# Patient Record
Sex: MALE | Race: WHITE | Employment: OTHER | URBAN - METROPOLITAN AREA
[De-identification: names, ages, dates, MRNs, and addresses within clinical notes are randomized per-mention and may not be internally consistent; named-entity substitution may affect disease eponyms.]

---

## 2024-03-12 ENCOUNTER — HOSPITAL ENCOUNTER (EMERGENCY)
Age: 74
Discharge: HOME | End: 2024-03-12
Attending: EMERGENCY MEDICINE
Payer: OTHER GOVERNMENT

## 2024-03-12 ENCOUNTER — APPOINTMENT (OUTPATIENT)
Dept: CT IMAGING | Age: 74
End: 2024-03-12
Payer: OTHER GOVERNMENT

## 2024-03-12 VITALS
TEMPERATURE: 97.8 F | SYSTOLIC BLOOD PRESSURE: 125 MMHG | OXYGEN SATURATION: 100 % | HEART RATE: 99 BPM | WEIGHT: 202.16 LBS | RESPIRATION RATE: 16 BRPM | HEIGHT: 73 IN | BODY MASS INDEX: 26.79 KG/M2 | DIASTOLIC BLOOD PRESSURE: 63 MMHG
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
## 2024-03-12 DIAGNOSIS — M54.2 NECK PAIN: ICD-10-CM

## 2024-03-12 DIAGNOSIS — M79.604 RIGHT LEG PAIN: ICD-10-CM

## 2024-03-12 DIAGNOSIS — M54.50 ACUTE BILATERAL LOW BACK PAIN, UNSPECIFIED WHETHER SCIATICA PRESENT: ICD-10-CM

## 2024-03-12 PROCEDURE — 6370000000 HC RX 637 (ALT 250 FOR IP): Performed by: EMERGENCY MEDICINE

## 2024-03-12 PROCEDURE — 99284 EMERGENCY DEPT VISIT MOD MDM: CPT | Performed by: EMERGENCY MEDICINE

## 2024-03-12 PROCEDURE — 72125 CT NECK SPINE W/O DYE: CPT

## 2024-03-12 PROCEDURE — 70450 CT HEAD/BRAIN W/O DYE: CPT

## 2024-03-12 PROCEDURE — 96372 THER/PROPH/DIAG INJ SC/IM: CPT | Performed by: EMERGENCY MEDICINE

## 2024-03-12 PROCEDURE — 72131 CT LUMBAR SPINE W/O DYE: CPT

## 2024-03-12 PROCEDURE — 6360000002 HC RX W HCPCS: Performed by: EMERGENCY MEDICINE

## 2024-03-12 RX ORDER — ORPHENADRINE CITRATE 30 MG/ML
60 INJECTION INTRAMUSCULAR; INTRAVENOUS ONCE
Status: COMPLETED | OUTPATIENT
Start: 2024-03-12 | End: 2024-03-12

## 2024-03-12 RX ORDER — TRAMADOL HYDROCHLORIDE 50 MG/1
50 TABLET ORAL ONCE
Status: COMPLETED | OUTPATIENT
Start: 2024-03-12 | End: 2024-03-12

## 2024-03-12 RX ORDER — LIDOCAINE 4 G/G
1 PATCH TOPICAL ONCE
Status: DISCONTINUED | OUTPATIENT
Start: 2024-03-12 | End: 2024-03-13 | Stop reason: HOSPADM

## 2024-03-12 RX ORDER — LIDOCAINE 4 G/G
1 PATCH TOPICAL DAILY
Qty: 30 PATCH | Refills: 0 | Status: SHIPPED | OUTPATIENT
Start: 2024-03-12 | End: 2024-04-11

## 2024-03-12 RX ORDER — TRAMADOL HYDROCHLORIDE 50 MG/1
50 TABLET ORAL EVERY 4 HOURS PRN
Qty: 12 TABLET | Refills: 0 | Status: SHIPPED | OUTPATIENT
Start: 2024-03-12 | End: 2024-03-15

## 2024-03-12 RX ADMIN — TRAMADOL HYDROCHLORIDE 50 MG: 50 TABLET ORAL at 22:01

## 2024-03-12 RX ADMIN — ORPHENADRINE CITRATE 60 MG: 60 INJECTION INTRAMUSCULAR; INTRAVENOUS at 20:35

## 2024-03-12 ASSESSMENT — LIFESTYLE VARIABLES
HOW MANY STANDARD DRINKS CONTAINING ALCOHOL DO YOU HAVE ON A TYPICAL DAY: PATIENT DOES NOT DRINK
HOW OFTEN DO YOU HAVE A DRINK CONTAINING ALCOHOL: NEVER

## 2024-03-12 ASSESSMENT — PAIN SCALES - GENERAL
PAINLEVEL_OUTOF10: 7
PAINLEVEL_OUTOF10: 8
PAINLEVEL_OUTOF10: 7

## 2024-03-12 ASSESSMENT — PAIN DESCRIPTION - PAIN TYPE: TYPE: ACUTE PAIN

## 2024-03-12 ASSESSMENT — PAIN DESCRIPTION - FREQUENCY: FREQUENCY: CONTINUOUS

## 2024-03-12 ASSESSMENT — PAIN DESCRIPTION - LOCATION
LOCATION: BACK;NECK
LOCATION: BACK;NECK

## 2024-03-12 ASSESSMENT — PAIN DESCRIPTION - ORIENTATION
ORIENTATION: RIGHT
ORIENTATION: RIGHT

## 2024-03-12 ASSESSMENT — PAIN - FUNCTIONAL ASSESSMENT: PAIN_FUNCTIONAL_ASSESSMENT: 0-10

## 2024-03-13 NOTE — ED TRIAGE NOTES
Pt arrives ambulatory with cane for eval of injuries from fall on Sunday.  Sts fell off of high bar chair, kamron loc, denies blood thinners. Pt sts pain to back of neck and right lower back traveling down right leg, hx of back surgeries. Pt is a/ox4, resp nonlabored and skin race appropriate warm and dry.

## 2024-03-14 NOTE — ED PROVIDER NOTES
unspecified whether sciatica present    4. Right leg pain          DISPOSITION/PLAN     DISPOSITION Decision To Discharge 03/12/2024 09:53:26 PM      PATIENT REFERRED TO:  [facility]  [phone]        [facility]  [address]  [address]  [facility] [address] [phone]  Schedule an appointment as soon as possible for a visit   As needed      DISCHARGE MEDICATIONS:  Discharge Medication List as of 3/12/2024 10:12 PM        START taking these medications    Details   lidocaine 4 % external patch Place 1 patch onto the skin daily, TransDERmal, DAILY Starting Tue 3/12/2024, Until Thu 4/11/2024, For 30 days, Disp-30 patch, R-0, Normal      diclofenac sodium (VOLTAREN) 1 % GEL Apply 4 g topically 4 times daily, Topical, 4 TIMES DAILY Starting Tue 3/12/2024, Disp-100 g, R-0, Normal      traMADol (ULTRAM) 50 MG tablet Take 1 tablet by mouth every 4 hours as needed for Pain for up to 3 days. Intended supply: 3 days. Take lowest dose possible to manage pain Max Daily Amount: 300 mg, Disp-12 tablet, R-0Normal             DISCONTINUED MEDICATIONS:  Discharge Medication List as of 3/12/2024 10:12 PM                 (Please note that portions of this note were completed with a voice recognition program.  Efforts were made to edit the dictations but occasionally words are mis-transcribed.)    Agustín Lomeli MD (electronically signed)            Agustín Lomeli MD  03/14/24 0057

## 2024-11-05 ENCOUNTER — HOSPITAL ENCOUNTER (INPATIENT)
Age: 74
LOS: 10 days | Discharge: HOME | End: 2024-11-15
Attending: NEUROLOGICAL SURGERY | Admitting: NEUROLOGICAL SURGERY
Payer: OTHER GOVERNMENT

## 2024-11-05 DIAGNOSIS — G95.9 CERVICAL MYELOPATHY (HCC): ICD-10-CM

## 2024-11-05 DIAGNOSIS — I48.21 PERMANENT ATRIAL FIBRILLATION (HCC): Primary | ICD-10-CM

## 2024-11-05 LAB
ABO + RH BLD: NORMAL
ALBUMIN SERPL-MCNC: 3.7 G/DL (ref 3.4–5)
ALBUMIN/GLOB SERPL: 1.8 {RATIO} (ref 1.1–2.2)
ALP SERPL-CCNC: 114 U/L (ref 40–129)
ALT SERPL-CCNC: 11 U/L (ref 10–40)
ANION GAP SERPL CALCULATED.3IONS-SCNC: 9 MMOL/L (ref 3–16)
ANISOCYTOSIS BLD QL SMEAR: ABNORMAL
APTT BLD: 27.9 SEC (ref 22.1–36.4)
AST SERPL-CCNC: 27 U/L (ref 15–37)
BASOPHILS # BLD: 0 K/UL (ref 0–0.2)
BASOPHILS NFR BLD: 0 %
BILIRUB SERPL-MCNC: <0.2 MG/DL (ref 0–1)
BLD GP AB SCN SERPL QL: NORMAL
BLOOD BANK DISPENSE STATUS: NORMAL
BLOOD BANK DISPENSE STATUS: NORMAL
BLOOD BANK PRODUCT CODE: NORMAL
BLOOD BANK PRODUCT CODE: NORMAL
BPU ID: NORMAL
BPU ID: NORMAL
BUN SERPL-MCNC: 31 MG/DL (ref 7–20)
CALCIUM SERPL-MCNC: 8.9 MG/DL (ref 8.3–10.6)
CHLORIDE SERPL-SCNC: 111 MMOL/L (ref 99–110)
CO2 SERPL-SCNC: 18 MMOL/L (ref 21–32)
CREAT SERPL-MCNC: 2.1 MG/DL (ref 0.8–1.3)
DEPRECATED RDW RBC AUTO: 16.8 % (ref 12.4–15.4)
DESCRIPTION BLOOD BANK: NORMAL
DESCRIPTION BLOOD BANK: NORMAL
EOSINOPHIL # BLD: 0 K/UL (ref 0–0.6)
EOSINOPHIL NFR BLD: 2 %
GFR SERPLBLD CREATININE-BSD FMLA CKD-EPI: 33 ML/MIN/{1.73_M2}
GLUCOSE SERPL-MCNC: 109 MG/DL (ref 70–99)
HCT VFR BLD AUTO: 35.9 % (ref 40.5–52.5)
HGB BLD-MCNC: 11.3 G/DL (ref 13.5–17.5)
INR PPP: 1.25 (ref 0.85–1.15)
LYMPHOCYTES # BLD: 0.2 K/UL (ref 1–5.1)
LYMPHOCYTES NFR BLD: 22 %
MCH RBC QN AUTO: 27.3 PG (ref 26–34)
MCHC RBC AUTO-ENTMCNC: 31.5 G/DL (ref 31–36)
MCV RBC AUTO: 86.8 FL (ref 80–100)
MICROCYTES BLD QL SMEAR: ABNORMAL
MONOCYTES # BLD: 0.1 K/UL (ref 0–1.3)
MONOCYTES NFR BLD: 9 %
NEUTROPHILS # BLD: 0.7 K/UL (ref 1.7–7.7)
NEUTROPHILS NFR BLD: 66 %
OVALOCYTES BLD QL SMEAR: ABNORMAL
PATH INTERP BLD-IMP: YES
PLATELET # BLD AUTO: 37 K/UL (ref 135–450)
PMV BLD AUTO: 10 FL (ref 5–10.5)
POTASSIUM SERPL-SCNC: 4.6 MMOL/L (ref 3.5–5.1)
PROT SERPL-MCNC: 5.8 G/DL (ref 6.4–8.2)
PROTHROMBIN TIME: 15.9 SEC (ref 11.9–14.9)
RBC # BLD AUTO: 4.14 M/UL (ref 4.2–5.9)
SODIUM SERPL-SCNC: 138 MMOL/L (ref 136–145)
VARIANT LYMPHS NFR BLD MANUAL: 1 % (ref 0–6)
WBC # BLD AUTO: 1 K/UL (ref 4–11)

## 2024-11-05 PROCEDURE — 86900 BLOOD TYPING SEROLOGIC ABO: CPT

## 2024-11-05 PROCEDURE — P9035 PLATELET PHERES LEUKOREDUCED: HCPCS

## 2024-11-05 PROCEDURE — 2580000003 HC RX 258: Performed by: ANESTHESIOLOGY

## 2024-11-05 PROCEDURE — 86901 BLOOD TYPING SEROLOGIC RH(D): CPT

## 2024-11-05 PROCEDURE — APPNB30 APP NON BILLABLE TIME 0-30 MINS: Performed by: NURSE PRACTITIONER

## 2024-11-05 PROCEDURE — 85610 PROTHROMBIN TIME: CPT

## 2024-11-05 PROCEDURE — 86850 RBC ANTIBODY SCREEN: CPT

## 2024-11-05 PROCEDURE — 1200000000 HC SEMI PRIVATE

## 2024-11-05 PROCEDURE — 80053 COMPREHEN METABOLIC PANEL: CPT

## 2024-11-05 PROCEDURE — 36430 TRANSFUSION BLD/BLD COMPNT: CPT

## 2024-11-05 PROCEDURE — 36415 COLL VENOUS BLD VENIPUNCTURE: CPT

## 2024-11-05 PROCEDURE — 6370000000 HC RX 637 (ALT 250 FOR IP): Performed by: NURSE PRACTITIONER

## 2024-11-05 PROCEDURE — P9036 PLATELET PHERESIS IRRADIATED: HCPCS

## 2024-11-05 PROCEDURE — 85025 COMPLETE CBC W/AUTO DIFF WBC: CPT

## 2024-11-05 PROCEDURE — 85730 THROMBOPLASTIN TIME PARTIAL: CPT

## 2024-11-05 PROCEDURE — 6360000002 HC RX W HCPCS: Performed by: NURSE PRACTITIONER

## 2024-11-05 RX ORDER — SODIUM CHLORIDE 9 MG/ML
INJECTION, SOLUTION INTRAVENOUS PRN
Status: DISCONTINUED | OUTPATIENT
Start: 2024-11-05 | End: 2024-11-11

## 2024-11-05 RX ORDER — HYDROMORPHONE HYDROCHLORIDE 1 MG/ML
0.25 INJECTION, SOLUTION INTRAMUSCULAR; INTRAVENOUS; SUBCUTANEOUS
Status: DISCONTINUED | OUTPATIENT
Start: 2024-11-05 | End: 2024-11-07

## 2024-11-05 RX ORDER — POLYETHYLENE GLYCOL 3350 17 G/17G
17 POWDER, FOR SOLUTION ORAL DAILY
Status: DISCONTINUED | OUTPATIENT
Start: 2024-11-05 | End: 2024-11-15 | Stop reason: HOSPADM

## 2024-11-05 RX ORDER — SODIUM CHLORIDE 0.9 % (FLUSH) 0.9 %
5-40 SYRINGE (ML) INJECTION EVERY 12 HOURS SCHEDULED
Status: DISCONTINUED | OUTPATIENT
Start: 2024-11-05 | End: 2024-11-15 | Stop reason: HOSPADM

## 2024-11-05 RX ORDER — CARVEDILOL 12.5 MG/1
12.5 TABLET ORAL 2 TIMES DAILY WITH MEALS
Status: DISCONTINUED | OUTPATIENT
Start: 2024-11-05 | End: 2024-11-10

## 2024-11-05 RX ORDER — SENNA AND DOCUSATE SODIUM 50; 8.6 MG/1; MG/1
1 TABLET, FILM COATED ORAL 2 TIMES DAILY
Status: DISCONTINUED | OUTPATIENT
Start: 2024-11-05 | End: 2024-11-15 | Stop reason: HOSPADM

## 2024-11-05 RX ORDER — DIAZEPAM 5 MG/1
5 TABLET ORAL EVERY 6 HOURS PRN
Status: DISCONTINUED | OUTPATIENT
Start: 2024-11-05 | End: 2024-11-14

## 2024-11-05 RX ORDER — LIDOCAINE HYDROCHLORIDE 10 MG/ML
1 INJECTION, SOLUTION EPIDURAL; INFILTRATION; INTRACAUDAL; PERINEURAL
Status: ACTIVE | OUTPATIENT
Start: 2024-11-05 | End: 2024-11-06

## 2024-11-05 RX ORDER — ACETAMINOPHEN 325 MG/1
650 TABLET ORAL EVERY 4 HOURS PRN
Status: DISCONTINUED | OUTPATIENT
Start: 2024-11-05 | End: 2024-11-06

## 2024-11-05 RX ORDER — SODIUM CHLORIDE, SODIUM LACTATE, POTASSIUM CHLORIDE, CALCIUM CHLORIDE 600; 310; 30; 20 MG/100ML; MG/100ML; MG/100ML; MG/100ML
INJECTION, SOLUTION INTRAVENOUS CONTINUOUS
Status: DISCONTINUED | OUTPATIENT
Start: 2024-11-06 | End: 2024-11-06

## 2024-11-05 RX ORDER — SODIUM CHLORIDE 0.9 % (FLUSH) 0.9 %
5-40 SYRINGE (ML) INJECTION PRN
Status: DISCONTINUED | OUTPATIENT
Start: 2024-11-05 | End: 2024-11-15 | Stop reason: HOSPADM

## 2024-11-05 RX ORDER — SODIUM CHLORIDE, SODIUM LACTATE, POTASSIUM CHLORIDE, CALCIUM CHLORIDE 600; 310; 30; 20 MG/100ML; MG/100ML; MG/100ML; MG/100ML
INJECTION, SOLUTION INTRAVENOUS CONTINUOUS
Status: DISCONTINUED | OUTPATIENT
Start: 2024-11-05 | End: 2024-11-05

## 2024-11-05 RX ORDER — ONDANSETRON 4 MG/1
4 TABLET, ORALLY DISINTEGRATING ORAL EVERY 8 HOURS PRN
Status: DISCONTINUED | OUTPATIENT
Start: 2024-11-05 | End: 2024-11-15 | Stop reason: HOSPADM

## 2024-11-05 RX ORDER — BISACODYL 10 MG
10 SUPPOSITORY, RECTAL RECTAL DAILY PRN
Status: DISCONTINUED | OUTPATIENT
Start: 2024-11-05 | End: 2024-11-15 | Stop reason: HOSPADM

## 2024-11-05 RX ORDER — ONDANSETRON 2 MG/ML
4 INJECTION INTRAMUSCULAR; INTRAVENOUS EVERY 6 HOURS PRN
Status: DISCONTINUED | OUTPATIENT
Start: 2024-11-05 | End: 2024-11-15 | Stop reason: HOSPADM

## 2024-11-05 RX ORDER — METHADONE HYDROCHLORIDE 10 MG/1
40 TABLET ORAL EVERY 12 HOURS
Status: DISCONTINUED | OUTPATIENT
Start: 2024-11-06 | End: 2024-11-15 | Stop reason: HOSPADM

## 2024-11-05 RX ORDER — HYDROMORPHONE HYDROCHLORIDE 1 MG/ML
0.5 INJECTION, SOLUTION INTRAMUSCULAR; INTRAVENOUS; SUBCUTANEOUS
Status: DISCONTINUED | OUTPATIENT
Start: 2024-11-05 | End: 2024-11-07

## 2024-11-05 RX ADMIN — CARVEDILOL 12.5 MG: 12.5 TABLET, FILM COATED ORAL at 20:47

## 2024-11-05 RX ADMIN — SODIUM CHLORIDE, PRESERVATIVE FREE 10 ML: 5 INJECTION INTRAVENOUS at 20:41

## 2024-11-05 RX ADMIN — TIZANIDINE 4 MG: 4 TABLET ORAL at 20:47

## 2024-11-05 RX ADMIN — SENNOSIDES AND DOCUSATE SODIUM 1 TABLET: 50; 8.6 TABLET ORAL at 20:47

## 2024-11-05 RX ADMIN — HYDROMORPHONE HYDROCHLORIDE 0.5 MG: 1 INJECTION, SOLUTION INTRAMUSCULAR; INTRAVENOUS; SUBCUTANEOUS at 20:45

## 2024-11-05 RX ADMIN — RIFAXIMIN 400 MG: 200 TABLET ORAL at 22:42

## 2024-11-05 RX ADMIN — DIAZEPAM 5 MG: 5 TABLET ORAL at 20:46

## 2024-11-05 ASSESSMENT — PAIN SCALES - GENERAL
PAINLEVEL_OUTOF10: 7
PAINLEVEL_OUTOF10: 8
PAINLEVEL_OUTOF10: 0
PAINLEVEL_OUTOF10: 3
PAINLEVEL_OUTOF10: 0
PAINLEVEL_OUTOF10: 3

## 2024-11-05 ASSESSMENT — PAIN DESCRIPTION - FREQUENCY
FREQUENCY: INTERMITTENT

## 2024-11-05 ASSESSMENT — PAIN - FUNCTIONAL ASSESSMENT
PAIN_FUNCTIONAL_ASSESSMENT: PREVENTS OR INTERFERES SOME ACTIVE ACTIVITIES AND ADLS
PAIN_FUNCTIONAL_ASSESSMENT: PREVENTS OR INTERFERES WITH ALL ACTIVE AND SOME PASSIVE ACTIVITIES

## 2024-11-05 ASSESSMENT — PAIN DESCRIPTION - PAIN TYPE
TYPE: CHRONIC PAIN

## 2024-11-05 ASSESSMENT — PAIN DESCRIPTION - ORIENTATION
ORIENTATION: RIGHT;LEFT
ORIENTATION: RIGHT;LEFT;MID;LOWER

## 2024-11-05 ASSESSMENT — PAIN DESCRIPTION - ONSET
ONSET: ON-GOING
ONSET: GRADUAL

## 2024-11-05 ASSESSMENT — PAIN DESCRIPTION - LOCATION: LOCATION: HAND;ARM;FOOT

## 2024-11-05 ASSESSMENT — PAIN DESCRIPTION - DESCRIPTORS
DESCRIPTORS: ACHING;DISCOMFORT;SHARP;SORE
DESCRIPTORS: ACHING;DISCOMFORT

## 2024-11-05 NOTE — PROGRESS NOTES
Consult received.  Discussed with bedside RN.  No acute needs.  Full note to follow.    Kye Lynne MD  [facility] Medicine  [facility]

## 2024-11-05 NOTE — PROGRESS NOTES
4 Eyes Skin Assessment     NAME:  Barrera Carbone  YOB: 1950  MEDICAL RECORD NUMBER:  [id number]    The patient is being assessed for  Admission    I agree that at least one RN has performed a thorough Head to Toe Skin Assessment on the patient. ALL assessment sites listed below have been assessed.      Areas assessed by both nurses:    Head, Face, Ears, Shoulders, Back, Chest, Arms, Elbows, Hands, Sacrum. Buttock, Coccyx, Ischium, Legs. Feet and Heels, and Under Medical Devices         Does the Patient have a Wound? No noted wound(s), blanchable redness on coccyx       Norberto Prevention initiated by RN: No  Wound Care Orders initiated by RN: No    Pressure Injury (Stage 3,4, Unstageable, DTI, NWPT, and Complex wounds) if present, place Wound referral order by RN under : No    New Ostomies, if present place, Ostomy referral order under : No     Nurse 1 eSignature: Electronically signed by Cynthia Crocker RN on 11/5/24 at 5:13 PM EST    **SHARE this note so that the co-signing nurse can place an eSignature**    Nurse 2 eSignature: {Esignature:[id number]}

## 2024-11-05 NOTE — H&P
Nurse Practitioner Pre-operative History and Physical    Barrera Carbone   [id number]        Problem: Cervical Myelopathy  Procedure: CERVICAL 3-CERVICAL 6 POSTERIOR CERVICAL DECOMPRESSION FUSION AND FIXATION     HISTORY OF PRESENT ILLNESS:    Patient is a 73 year old man with a history of CAD on ASA who presented with imbalance and difficulty with fine motor movements. He reports neck and bilateral arm pain over the last 8 months with progressive numbness involving both upper and lower extremities. He notes whenever he extends his head he begins to have muscle spasms in the neck and the lower extremities. He has become progressively more unsteady during ambulation and has begun to use a walker simply to stand. He reports difficulty with fine motor movements of his hands and is no longer able to button buttons or use zippers. Patient has progressive fine motor dysfunction and imbalance in a pattern consistent with cervical myelopathy. 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 Dr. Prater discussed the management options with Mr. Carbone and his wife during their last office visit, including 1) continued observation, 2) C3-6 ACDF, or 3) C3-6 posterior decompression, fusion, and fixation. Given his symptoms and the appearance of the calcified disc spaces, Dr. Prater recommended proceeding with a posterior cervical approach with compression, fusion, and fixation extending from C3-6. The patient is aware of the potential benefits of the operation as well as the possible risks including (but not limited to): infection, bleeding, wound-healing problems, worsened neurologic outcome, damage to surrounding structures, pseudoarthrosis, hardware failure, need for additional procedures, stroke, coma, or even death. The patient understands that untoward events during

## 2024-11-06 ENCOUNTER — APPOINTMENT (OUTPATIENT)
Dept: ULTRASOUND IMAGING | Age: 74
End: 2024-11-06
Attending: NEUROLOGICAL SURGERY
Payer: OTHER GOVERNMENT

## 2024-11-06 LAB
ALBUMIN SERPL-MCNC: 3.6 G/DL (ref 3.4–5)
ALBUMIN/GLOB SERPL: 2.1 {RATIO} (ref 1.1–2.2)
ALP SERPL-CCNC: 102 U/L (ref 40–129)
ALT SERPL-CCNC: 9 U/L (ref 10–40)
ANION GAP SERPL CALCULATED.3IONS-SCNC: 6 MMOL/L (ref 3–16)
AST SERPL-CCNC: 20 U/L (ref 15–37)
BASOPHILS # BLD: 0 K/UL (ref 0–0.2)
BASOPHILS NFR BLD: 0.4 %
BILIRUB SERPL-MCNC: 0.5 MG/DL (ref 0–1)
BUN SERPL-MCNC: 29 MG/DL (ref 7–20)
CALCIUM SERPL-MCNC: 9 MG/DL (ref 8.3–10.6)
CHLORIDE SERPL-SCNC: 110 MMOL/L (ref 99–110)
CO2 SERPL-SCNC: 22 MMOL/L (ref 21–32)
CREAT SERPL-MCNC: 1.9 MG/DL (ref 0.8–1.3)
DEPRECATED RDW RBC AUTO: 16.5 % (ref 12.4–15.4)
EOSINOPHIL # BLD: 0 K/UL (ref 0–0.6)
EOSINOPHIL NFR BLD: 3.6 %
FERRITIN SERPL IA-MCNC: 38.4 NG/ML (ref 30–400)
FOLATE SERPL-MCNC: 5.11 NG/ML (ref 4.78–24.2)
GFR SERPLBLD CREATININE-BSD FMLA CKD-EPI: 37 ML/MIN/{1.73_M2}
GLUCOSE SERPL-MCNC: 81 MG/DL (ref 70–99)
HCT VFR BLD AUTO: 33.2 % (ref 40.5–52.5)
HGB BLD-MCNC: 10.6 G/DL (ref 13.5–17.5)
IMMATURE RETIC FRACT: 0.35 (ref 0.21–0.37)
LDH SERPL L TO P-CCNC: 156 U/L (ref 100–190)
LYMPHOCYTES # BLD: 0.3 K/UL (ref 1–5.1)
LYMPHOCYTES NFR BLD: 31.1 %
MCH RBC QN AUTO: 27.1 PG (ref 26–34)
MCHC RBC AUTO-ENTMCNC: 31.8 G/DL (ref 31–36)
MCV RBC AUTO: 85.2 FL (ref 80–100)
MONOCYTES # BLD: 0.1 K/UL (ref 0–1.3)
MONOCYTES NFR BLD: 8.7 %
NEUTROPHILS # BLD: 0.5 K/UL (ref 1.7–7.7)
NEUTROPHILS NFR BLD: 56.2 %
PATH INTERP BLD-IMP: NORMAL
PATH INTERP BLD-IMP: NORMAL
PLATELET # BLD AUTO: 57 K/UL (ref 135–450)
PLATELET # BLD AUTO: 59 K/UL (ref 135–450)
PMV BLD AUTO: 9.4 FL (ref 5–10.5)
POTASSIUM SERPL-SCNC: 4.5 MMOL/L (ref 3.5–5.1)
PROT SERPL-MCNC: 5.3 G/DL (ref 6.4–8.2)
RBC # BLD AUTO: 3.9 M/UL (ref 4.2–5.9)
RETICS # AUTO: 0.03 M/UL
RETICS/RBC NFR AUTO: 0.74 % (ref 0.5–2.18)
SODIUM SERPL-SCNC: 138 MMOL/L (ref 136–145)
VIT B12 SERPL-MCNC: 419 PG/ML (ref 211–911)
WBC # BLD AUTO: 1 K/UL (ref 4–11)

## 2024-11-06 PROCEDURE — 82728 ASSAY OF FERRITIN: CPT

## 2024-11-06 PROCEDURE — 84630 ASSAY OF ZINC: CPT

## 2024-11-06 PROCEDURE — 83550 IRON BINDING TEST: CPT

## 2024-11-06 PROCEDURE — 82525 ASSAY OF COPPER: CPT

## 2024-11-06 PROCEDURE — 1200000000 HC SEMI PRIVATE

## 2024-11-06 PROCEDURE — 85045 AUTOMATED RETICULOCYTE COUNT: CPT

## 2024-11-06 PROCEDURE — 85049 AUTOMATED PLATELET COUNT: CPT

## 2024-11-06 PROCEDURE — 2580000003 HC RX 258: Performed by: ANESTHESIOLOGY

## 2024-11-06 PROCEDURE — 2580000003 HC RX 258: Performed by: NURSE PRACTITIONER

## 2024-11-06 PROCEDURE — 82746 ASSAY OF FOLIC ACID SERUM: CPT

## 2024-11-06 PROCEDURE — 6370000000 HC RX 637 (ALT 250 FOR IP): Performed by: NURSE PRACTITIONER

## 2024-11-06 PROCEDURE — 6360000002 HC RX W HCPCS: Performed by: NURSE PRACTITIONER

## 2024-11-06 PROCEDURE — 83010 ASSAY OF HAPTOGLOBIN QUANT: CPT

## 2024-11-06 PROCEDURE — 36415 COLL VENOUS BLD VENIPUNCTURE: CPT

## 2024-11-06 PROCEDURE — 83615 LACTATE (LD) (LDH) ENZYME: CPT

## 2024-11-06 PROCEDURE — 80053 COMPREHEN METABOLIC PANEL: CPT

## 2024-11-06 PROCEDURE — 83540 ASSAY OF IRON: CPT

## 2024-11-06 PROCEDURE — 76705 ECHO EXAM OF ABDOMEN: CPT

## 2024-11-06 PROCEDURE — 82668 ASSAY OF ERYTHROPOIETIN: CPT

## 2024-11-06 PROCEDURE — 82607 VITAMIN B-12: CPT

## 2024-11-06 PROCEDURE — 85025 COMPLETE CBC W/AUTO DIFF WBC: CPT

## 2024-11-06 RX ORDER — METHOCARBAMOL 100 MG/ML
1000 INJECTION, SOLUTION INTRAMUSCULAR; INTRAVENOUS EVERY 8 HOURS
Status: COMPLETED | OUTPATIENT
Start: 2024-11-06 | End: 2024-11-08

## 2024-11-06 RX ORDER — ACETAMINOPHEN 325 MG/1
650 TABLET ORAL 2 TIMES DAILY
Status: DISPENSED | OUTPATIENT
Start: 2024-11-06 | End: 2024-11-09

## 2024-11-06 RX ORDER — OXYCODONE HYDROCHLORIDE 5 MG/1
5 TABLET ORAL EVERY 4 HOURS PRN
Status: DISCONTINUED | OUTPATIENT
Start: 2024-11-06 | End: 2024-11-06

## 2024-11-06 RX ORDER — METHOCARBAMOL 750 MG/1
750 TABLET, FILM COATED ORAL EVERY 6 HOURS
Status: DISCONTINUED | OUTPATIENT
Start: 2024-11-08 | End: 2024-11-14

## 2024-11-06 RX ORDER — OXYCODONE HYDROCHLORIDE 5 MG/1
10 TABLET ORAL EVERY 4 HOURS PRN
Status: DISCONTINUED | OUTPATIENT
Start: 2024-11-06 | End: 2024-11-07

## 2024-11-06 RX ORDER — METHOCARBAMOL 500 MG/1
750 TABLET, FILM COATED ORAL EVERY 6 HOURS SCHEDULED
Status: DISCONTINUED | OUTPATIENT
Start: 2024-11-08 | End: 2024-11-06

## 2024-11-06 RX ORDER — OXYCODONE HYDROCHLORIDE 5 MG/1
5 TABLET ORAL EVERY 4 HOURS PRN
Status: DISCONTINUED | OUTPATIENT
Start: 2024-11-06 | End: 2024-11-07

## 2024-11-06 RX ADMIN — SENNOSIDES AND DOCUSATE SODIUM 1 TABLET: 50; 8.6 TABLET ORAL at 07:55

## 2024-11-06 RX ADMIN — CARVEDILOL 12.5 MG: 12.5 TABLET, FILM COATED ORAL at 07:56

## 2024-11-06 RX ADMIN — ACETAMINOPHEN 650 MG: 325 TABLET ORAL at 11:08

## 2024-11-06 RX ADMIN — METHOCARBAMOL 1000 MG: 100 INJECTION INTRAMUSCULAR; INTRAVENOUS at 17:06

## 2024-11-06 RX ADMIN — CARVEDILOL 12.5 MG: 12.5 TABLET, FILM COATED ORAL at 17:04

## 2024-11-06 RX ADMIN — SODIUM CHLORIDE, PRESERVATIVE FREE 10 ML: 5 INJECTION INTRAVENOUS at 22:48

## 2024-11-06 RX ADMIN — DIAZEPAM 5 MG: 5 TABLET ORAL at 09:25

## 2024-11-06 RX ADMIN — RIFAXIMIN 400 MG: 200 TABLET ORAL at 16:11

## 2024-11-06 RX ADMIN — SENNOSIDES AND DOCUSATE SODIUM 1 TABLET: 50; 8.6 TABLET ORAL at 22:46

## 2024-11-06 RX ADMIN — SODIUM CHLORIDE, PRESERVATIVE FREE 10 ML: 5 INJECTION INTRAVENOUS at 23:50

## 2024-11-06 RX ADMIN — HYDROMORPHONE HYDROCHLORIDE 0.5 MG: 1 INJECTION, SOLUTION INTRAMUSCULAR; INTRAVENOUS; SUBCUTANEOUS at 09:28

## 2024-11-06 RX ADMIN — OXYCODONE 5 MG: 5 TABLET ORAL at 13:31

## 2024-11-06 RX ADMIN — METHADONE HYDROCHLORIDE 40 MG: 10 TABLET ORAL at 17:04

## 2024-11-06 RX ADMIN — OXYCODONE 10 MG: 5 TABLET ORAL at 22:46

## 2024-11-06 RX ADMIN — SODIUM CHLORIDE, PRESERVATIVE FREE 10 ML: 5 INJECTION INTRAVENOUS at 07:57

## 2024-11-06 RX ADMIN — SODIUM CHLORIDE, PRESERVATIVE FREE 10 ML: 5 INJECTION INTRAVENOUS at 22:46

## 2024-11-06 RX ADMIN — ACETAMINOPHEN 650 MG: 325 TABLET ORAL at 22:46

## 2024-11-06 RX ADMIN — SODIUM CHLORIDE, SODIUM LACTATE, POTASSIUM CHLORIDE, AND CALCIUM CHLORIDE: .6; .31; .03; .02 INJECTION, SOLUTION INTRAVENOUS at 00:26

## 2024-11-06 RX ADMIN — SODIUM CHLORIDE, PRESERVATIVE FREE 10 ML: 5 INJECTION INTRAVENOUS at 06:48

## 2024-11-06 RX ADMIN — METHOCARBAMOL 1000 MG: 100 INJECTION INTRAMUSCULAR; INTRAVENOUS at 23:50

## 2024-11-06 RX ADMIN — TIZANIDINE 4 MG: 4 TABLET ORAL at 07:55

## 2024-11-06 RX ADMIN — RIFAXIMIN 400 MG: 200 TABLET ORAL at 22:48

## 2024-11-06 RX ADMIN — RIFAXIMIN 400 MG: 200 TABLET ORAL at 07:56

## 2024-11-06 RX ADMIN — HYDROMORPHONE HYDROCHLORIDE 0.5 MG: 1 INJECTION, SOLUTION INTRAMUSCULAR; INTRAVENOUS; SUBCUTANEOUS at 06:48

## 2024-11-06 RX ADMIN — DIAZEPAM 5 MG: 5 TABLET ORAL at 16:12

## 2024-11-06 RX ADMIN — SODIUM CHLORIDE, PRESERVATIVE FREE 10 ML: 5 INJECTION INTRAVENOUS at 00:25

## 2024-11-06 RX ADMIN — METHADONE HYDROCHLORIDE 40 MG: 10 TABLET ORAL at 06:07

## 2024-11-06 RX ADMIN — TIZANIDINE 4 MG: 4 TABLET ORAL at 13:31

## 2024-11-06 RX ADMIN — ACETAMINOPHEN 650 MG: 325 TABLET ORAL at 16:12

## 2024-11-06 ASSESSMENT — PAIN DESCRIPTION - FREQUENCY
FREQUENCY: INTERMITTENT

## 2024-11-06 ASSESSMENT — PAIN SCALES - GENERAL
PAINLEVEL_OUTOF10: 7
PAINLEVEL_OUTOF10: 0
PAINLEVEL_OUTOF10: 0
PAINLEVEL_OUTOF10: 3
PAINLEVEL_OUTOF10: 9
PAINLEVEL_OUTOF10: 4
PAINLEVEL_OUTOF10: 4
PAINLEVEL_OUTOF10: 6
PAINLEVEL_OUTOF10: 8
PAINLEVEL_OUTOF10: 4
PAINLEVEL_OUTOF10: 4
PAINLEVEL_OUTOF10: 5
PAINLEVEL_OUTOF10: 6
PAINLEVEL_OUTOF10: 3
PAINLEVEL_OUTOF10: 7
PAINLEVEL_OUTOF10: 4
PAINLEVEL_OUTOF10: 5
PAINLEVEL_OUTOF10: 5
PAINLEVEL_OUTOF10: 4
PAINLEVEL_OUTOF10: 7
PAINLEVEL_OUTOF10: 7
PAINLEVEL_OUTOF10: 0
PAINLEVEL_OUTOF10: 0

## 2024-11-06 ASSESSMENT — PAIN DESCRIPTION - ONSET
ONSET: ON-GOING

## 2024-11-06 ASSESSMENT — PAIN DESCRIPTION - PAIN TYPE
TYPE: CHRONIC PAIN

## 2024-11-06 ASSESSMENT — PAIN - FUNCTIONAL ASSESSMENT
PAIN_FUNCTIONAL_ASSESSMENT: ACTIVITIES ARE NOT PREVENTED
PAIN_FUNCTIONAL_ASSESSMENT: PREVENTS OR INTERFERES SOME ACTIVE ACTIVITIES AND ADLS
PAIN_FUNCTIONAL_ASSESSMENT: PREVENTS OR INTERFERES WITH ALL ACTIVE AND SOME PASSIVE ACTIVITIES
PAIN_FUNCTIONAL_ASSESSMENT: ACTIVITIES ARE NOT PREVENTED
PAIN_FUNCTIONAL_ASSESSMENT: ACTIVITIES ARE NOT PREVENTED
PAIN_FUNCTIONAL_ASSESSMENT: PREVENTS OR INTERFERES WITH ALL ACTIVE AND SOME PASSIVE ACTIVITIES
PAIN_FUNCTIONAL_ASSESSMENT: PREVENTS OR INTERFERES SOME ACTIVE ACTIVITIES AND ADLS
PAIN_FUNCTIONAL_ASSESSMENT: ACTIVITIES ARE NOT PREVENTED
PAIN_FUNCTIONAL_ASSESSMENT: ACTIVITIES ARE NOT PREVENTED

## 2024-11-06 ASSESSMENT — PAIN DESCRIPTION - DESCRIPTORS
DESCRIPTORS: ACHING;DISCOMFORT

## 2024-11-06 ASSESSMENT — PAIN DESCRIPTION - LOCATION
LOCATION: FOOT;HAND
LOCATION: HAND

## 2024-11-06 ASSESSMENT — PAIN DESCRIPTION - ORIENTATION
ORIENTATION: RIGHT;LEFT
ORIENTATION: RIGHT;LEFT;MID;LOWER
ORIENTATION: RIGHT;LEFT
ORIENTATION: RIGHT;LEFT;MID;UPPER
ORIENTATION: RIGHT;LEFT
ORIENTATION: RIGHT;LEFT
ORIENTATION: LEFT;RIGHT
ORIENTATION: RIGHT;LEFT;MID;UPPER
ORIENTATION: RIGHT;LEFT

## 2024-11-06 NOTE — CARE COORDINATION
Case Management Assessment  Initial Evaluation    Date/Time of Evaluation: 11/6/2024 3:00 PM  Assessment Completed by: Xavier Pastrana RN    If patient is discharged prior to next notation, then this note serves as note for discharge by case management.    Patient Name: Barrera Carbone                   YOB: 1950  Diagnosis: Cervical myelopathy (HCC) [G95.9]                   Date / Time: 11/5/2024  3:17 PM    Patient Admission Status: Inpatient   Readmission Risk (Low < 19, Mod (19-27), High > 27): Readmission Risk Score: 14.4    Current PCP: No primary care provider on file.  PCP verified by CM? No    Chart Reviewed: Yes      History Provided by: Medical Record  Patient Orientation: Alert and Oriented    Patient Cognition: Alert    Hospitalization in the last 30 days (Readmission):  No    If yes, Readmission Assessment in CM Navigator will be completed and shown below.          Advance Directives:      Code Status: Full Code   Patient's Primary Decision Maker is: Legal Next of Kin      Discharge Planning:    Patient lives with: Spouse/Significant Other Type of Home: House  Primary Care Giver: Self  Patient Support Systems include: Spouse/Significant Other, Family Members   Current Financial resources: [facility]  Current community resources:    Current services prior to admission: None            Current DME:              Type of Home Care services:  None    ADLS  Prior functional level: Independent in ADLs/IADLs  Current functional level: Other (see comment) (tbd)    PT AM-PAC:   /24  OT AM-PAC:   /24    Family can provide assistance at DC: Yes  Would you like Case Management to discuss the discharge plan with any other family members/significant others, and if so, who? No  Plans to Return to Present Housing: Yes  Other Identified Issues/Barriers to RETURNING to current housing: medical complications  Potential Assistance needed at discharge: N/A            Potential DME:    Patient expects to

## 2024-11-06 NOTE — PLAN OF CARE
Problem: Chronic Conditions and Co-morbidities  Goal: Patient's chronic conditions and co-morbidity symptoms are monitored and maintained or improved  Outcome: Progressing     Problem: Discharge Planning  Goal: Discharge to home or other facility with appropriate resources  Outcome: Progressing     Problem: Pain  Goal: Verbalizes/displays adequate comfort level or baseline comfort level  11/6/2024 1037 by Svetlana Ta RN  Outcome: Progressing  Utilizing prns (see MAR) and position of comfort with effective relief.      Problem: Safety - Adult  Goal: Free from fall injury  11/6/2024 1037 by Svetlana Ta RN  Outcome: Progressing  Patient is a fall risk. Fall risk protocol in place as per fall risk score, see assessment for details. Bed is locked and in lowest possible position, side rails up x2, alarm in place and on, no slip footwear on. Call light/belongings within reach. Patient utilizes call light appropriately for assist as needed. Hourly rounds in place for safety, pt remains free from fall/injury. Will continue to monitor.      Problem: ABCDS Injury Assessment  Goal: Absence of physical injury  Outcome: Progressing

## 2024-11-06 NOTE — PROGRESS NOTES
Second unit of platelets infusing per right FA PIV as ordered.  No signs or symptoms of transfusion reaction noted.  Vitals stable as charted.  No needs at this time.  Patient resting comfortably in bed.  Call light in reach.  Bed alarm on.  Will continue to monitor.   Electronically signed by Larissa Daly RN on 11/6/2024 at 12:20 AM

## 2024-11-06 NOTE — PROGRESS NOTES
Single unit of platelets complete per right FA PIV as ordered.  No signs or symptoms of transfusion reaction.  Vitals stable as charted.  No needs at this time.  Patient resting comfortably in bed.  Call light in reach.  Bed alarm on.  Will continue to monitor.   Electronically signed by Larissa Daly RN on 11/6/2024 at 12:18 AM

## 2024-11-06 NOTE — PROGRESS NOTES
First unit of platelets infusing per right FA PIV as ordered.  No signs or symptoms of transfusion reaction noted.  Vitals stable as charted.  No needs at this time.  Patient resting comfortably in bed.  Call light in reach.  Bed alarm on.  Will continue to monitor.   Electronically signed by Larissa Daly RN on 11/6/2024 at 12:22 AM

## 2024-11-06 NOTE — PROGRESS NOTES
Neurosurgery Progress Note    Patient seen and examined on 11/06/24. No acute events overnight. Reports persistent bilateral UE weakness and difficulty ambulating. Platelets were 37k but ANC is 0.7 with WBC 1.0.     A/P: 75 yo man with cervical myelopathy but with thrombocytopenia and neutropenia. Patient does have known pancytopenia and was previously at platelets 60-70k and WBC of 3.0. [facility] but worse numbers now.     -Neuro stable  -Recheck platelets/CBC with diff  -Hematology consultation  -Hospitalist following  -Will reassess for C3-6 decompression but will need to postpone to allow for further workup of neutropenia. Discussed at length with patient.       Colten Prater MD, PhD  [address], [address]  [place], [place], [address] [phone] (c), [phone] (o)

## 2024-11-06 NOTE — PROGRESS NOTES
NEUROSURGERY     XAVIER STUBBS   [id number]   1950   11/6/2024    Interval History:  Hospital Day #1      Subjective: Pt plt lower than normal and neutropenic. Dr. Prater cancelled his surgery.                                                                                                                                                                                                                                                                                                                                                                                                                                                                                                                                                                                                                                                                                                                                                                                                                        Objective:  BP (!) 164/83   Pulse 73   Temp 98.1 °F (36.7 °C) (Oral)   Resp 16   Ht 1.854 m (6' 1\")   Wt 85.1 kg (187 lb 9.8 oz)   SpO2 95%   BMI 24.75 kg/m²     Labs:  Recent Labs     11/05/24  1810      *   CO2 18*   BUN 31*   CREATININE 2.1*   GLUCOSE 109*     Recent Labs     11/05/24  1602 11/05/24  1810   WBC  --  1.0*   RBC  --  4.14*   INR 1.25*  --        Neurologic Exam:  GCS:  4 - Opens eyes on own  5 - Alert and oriented  6 - Follows simple motor commands    Mental Status: Awake, alert, oriented x 4, speech clear and appropriate  Language: No aphasia or dysarthria noted  Sensation: Intact to all extremities to light touch  Coordination: Intact      Musculoskeletal:   Gait: Not tested   Tone: normal  Motor strength:              Right  Left      Right  Left    Deltoid  3 3   Hip Flex  5 5   Biceps  4 4   Knee Extensors  5 5   Triceps  5 5   Knee Flexors  5 5   Wrist Ext  5 5   Ankle Dorsiflex.  5 5   Wrist
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
## 2024-11-06 NOTE — PROGRESS NOTES
First unit of platelets infusing per right FA PIV as ordered.  No signs or symptoms of transfusion reaction noted.  Vitals stable as charted.  No needs at this time.  Patient resting comfortably in bed.  Call light in reach.  Bed alarm on.  Will continue to monitor.   Electronically signed by Larissa Daly RN on 11/6/2024 at 12:45 AM

## 2024-11-06 NOTE — PROGRESS NOTES
First unit of platelets started per right FA PIV as ordered.  Electronically signed by Larissa Daly RN on 11/6/2024 at 12:19 AM
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
## 2024-11-06 NOTE — CONSULTS
Clinical Pharmacy Progress Note    Assessment/Plan:  1)  Pain regimen recommendations:  Pt does have hx of cirrhosis - would be cautious with acetaminophen dosing, but could consider scheduled APAP 650mg po BID for short period of time to encourage multi-modal analgesia  Consider trial of Methcarbamol 1000mg IV q8h x 3 doses, then PO methocarbamol 750mg PO q6h   Recommend to continue chronic methadone 40mg po BID - [facility]  Given chronic methadone use, pt may require higher doses of opioids -- ordered oxycodone 5mg PO q4h PRN moderate/severe pain. Could consider increasing to 5-10mg po q4h PRN (5 mg for moderate / 10mg for severe pain).   If pain continues to be an issue and is tolerating oxycodone and/or hydromorphone IV doses, could increase dose for better pain control    Will notify CHARISSA Juarez of above recommendations.  Will continue to monitor and assist with adjustments to regimen as needed.    Please call with questions--  Lara Moore, PharmD, [facility]  Wireless: [phone]   11/6/2024 1:15 PM    _________________________________________________________    Admit date: 11/5/2024     Subjective/Objective:  Pt is a 74yoM with a PMHx that includes CAD, renal cancer, cirrhosis, DM, HF, thrombocytopenia who was admitted initially for C3-C6 cervical decompression fusion and fixation but was found to have platelet count < 50k and surgery was cancelled. Heme/onc has been consulted.    Pharmacy has been consulted to make pain medication recommendations by CHARISSA Juarez.    11/6: Pt sleeping very soundly when I went to speak with them. Spoke with Svetlana MARTINEZ who stated that pt has been very uncomfortable all morning and had difficulty getting into a comfortable posistion. She states that she talked to pt's daughter who verified that pt takes methadone 40mg BID chronically for back pain. She states that IV hydromorphone has been helping but wears off in ~1h. New orders

## 2024-11-06 NOTE — PROGRESS NOTES
Second unit of platelets started per right FA PIV as ordered.  Electronically signed by Larissa Daly RN on 11/6/2024 at 12:19 AM

## 2024-11-06 NOTE — PROGRESS NOTES
Patient is A&O x4.  RA, sat 97%.  No complaints of SOB.  Medicated as needed for pain.   Neurological assessment as documented in doc flow sheets.  Vital signs stable as charted.  Respirations appear to easy and unlabored.  Lungs clear.  Respirations easy with no complaints of cough.  No complaints of nausea/vomiting/diarrhea.  Up with assist and walker to the bathroom or chair as needed.  Tolerating regular diet.  NPO after midnight for AM surgery.  Right and left FA PIVs intact and flushed.  Coccyx red, encouraged turning.  On specialty low air loss mattress.    Plan of care and safety measures reviewed with the patient.  Call light in reach and bed alarm in place.  Bed attached to wall for alarm purposes.  Will continue to monitor.  Electronically signed by Larissa Daly RN on 11/5/2024 at 11:15 PM

## 2024-11-07 LAB
ALBUMIN SERPL-MCNC: 3.9 G/DL (ref 3.4–5)
ANION GAP SERPL CALCULATED.3IONS-SCNC: 7 MMOL/L (ref 3–16)
BUN SERPL-MCNC: 34 MG/DL (ref 7–20)
CALCIUM SERPL-MCNC: 9.4 MG/DL (ref 8.3–10.6)
CHLORIDE SERPL-SCNC: 108 MMOL/L (ref 99–110)
CO2 SERPL-SCNC: 25 MMOL/L (ref 21–32)
CREAT SERPL-MCNC: 2.2 MG/DL (ref 0.8–1.3)
DEPRECATED RDW RBC AUTO: 17 % (ref 12.4–15.4)
GFR SERPLBLD CREATININE-BSD FMLA CKD-EPI: 31 ML/MIN/{1.73_M2}
GLUCOSE SERPL-MCNC: 90 MG/DL (ref 70–99)
HAPTOGLOB SERPL-MCNC: 75 MG/DL (ref 30–200)
HCT VFR BLD AUTO: 36.7 % (ref 40.5–52.5)
HGB BLD-MCNC: 11.6 G/DL (ref 13.5–17.5)
IRON SATN MFR SERPL: 24 % (ref 20–50)
IRON SERPL-MCNC: 61 UG/DL (ref 59–158)
MCH RBC QN AUTO: 27.4 PG (ref 26–34)
MCHC RBC AUTO-ENTMCNC: 31.7 G/DL (ref 31–36)
MCV RBC AUTO: 86.6 FL (ref 80–100)
PHOSPHATE SERPL-MCNC: 3.8 MG/DL (ref 2.5–4.9)
PLATELET # BLD AUTO: 57 K/UL (ref 135–450)
PMV BLD AUTO: 9.2 FL (ref 5–10.5)
POTASSIUM SERPL-SCNC: 5 MMOL/L (ref 3.5–5.1)
RBC # BLD AUTO: 4.24 M/UL (ref 4.2–5.9)
SODIUM SERPL-SCNC: 140 MMOL/L (ref 136–145)
TIBC SERPL-MCNC: 251 UG/DL (ref 260–445)
WBC # BLD AUTO: 1 K/UL (ref 4–11)

## 2024-11-07 PROCEDURE — 6360000002 HC RX W HCPCS: Performed by: NURSE PRACTITIONER

## 2024-11-07 PROCEDURE — 80069 RENAL FUNCTION PANEL: CPT

## 2024-11-07 PROCEDURE — 6370000000 HC RX 637 (ALT 250 FOR IP): Performed by: NURSE PRACTITIONER

## 2024-11-07 PROCEDURE — 97530 THERAPEUTIC ACTIVITIES: CPT

## 2024-11-07 PROCEDURE — 36415 COLL VENOUS BLD VENIPUNCTURE: CPT

## 2024-11-07 PROCEDURE — 6360000002 HC RX W HCPCS: Performed by: INTERNAL MEDICINE

## 2024-11-07 PROCEDURE — 2580000003 HC RX 258: Performed by: ANESTHESIOLOGY

## 2024-11-07 PROCEDURE — 85027 COMPLETE CBC AUTOMATED: CPT

## 2024-11-07 PROCEDURE — 1200000000 HC SEMI PRIVATE

## 2024-11-07 PROCEDURE — 97535 SELF CARE MNGMENT TRAINING: CPT

## 2024-11-07 PROCEDURE — 97166 OT EVAL MOD COMPLEX 45 MIN: CPT

## 2024-11-07 PROCEDURE — 97116 GAIT TRAINING THERAPY: CPT

## 2024-11-07 PROCEDURE — 97162 PT EVAL MOD COMPLEX 30 MIN: CPT

## 2024-11-07 RX ORDER — GABAPENTIN 100 MG/1
100 CAPSULE ORAL 3 TIMES DAILY
Status: DISCONTINUED | OUTPATIENT
Start: 2024-11-07 | End: 2024-11-15 | Stop reason: HOSPADM

## 2024-11-07 RX ORDER — OXYCODONE HYDROCHLORIDE 5 MG/1
10 TABLET ORAL EVERY 4 HOURS PRN
Status: DISCONTINUED | OUTPATIENT
Start: 2024-11-07 | End: 2024-11-15 | Stop reason: HOSPADM

## 2024-11-07 RX ORDER — DEXAMETHASONE 4 MG/1
40 TABLET ORAL DAILY
Status: COMPLETED | OUTPATIENT
Start: 2024-11-07 | End: 2024-11-10

## 2024-11-07 RX ORDER — HYDROMORPHONE HYDROCHLORIDE 1 MG/ML
0.5 INJECTION, SOLUTION INTRAMUSCULAR; INTRAVENOUS; SUBCUTANEOUS
Status: DISCONTINUED | OUTPATIENT
Start: 2024-11-07 | End: 2024-11-14

## 2024-11-07 RX ADMIN — SENNOSIDES AND DOCUSATE SODIUM 1 TABLET: 50; 8.6 TABLET ORAL at 21:29

## 2024-11-07 RX ADMIN — RIFAXIMIN 400 MG: 200 TABLET ORAL at 21:28

## 2024-11-07 RX ADMIN — RIFAXIMIN 400 MG: 200 TABLET ORAL at 09:35

## 2024-11-07 RX ADMIN — METHADONE HYDROCHLORIDE 40 MG: 10 TABLET ORAL at 06:10

## 2024-11-07 RX ADMIN — DEXAMETHASONE 40 MG: 4 TABLET ORAL at 15:10

## 2024-11-07 RX ADMIN — SODIUM CHLORIDE, PRESERVATIVE FREE 10 ML: 5 INJECTION INTRAVENOUS at 21:28

## 2024-11-07 RX ADMIN — METHOCARBAMOL 1000 MG: 100 INJECTION INTRAMUSCULAR; INTRAVENOUS at 21:28

## 2024-11-07 RX ADMIN — HYDROMORPHONE HYDROCHLORIDE 0.5 MG: 1 INJECTION, SOLUTION INTRAMUSCULAR; INTRAVENOUS; SUBCUTANEOUS at 08:20

## 2024-11-07 RX ADMIN — METHADONE HYDROCHLORIDE 40 MG: 10 TABLET ORAL at 16:57

## 2024-11-07 RX ADMIN — RIFAXIMIN 400 MG: 200 TABLET ORAL at 15:00

## 2024-11-07 RX ADMIN — HYDROMORPHONE HYDROCHLORIDE 0.5 MG: 1 INJECTION, SOLUTION INTRAMUSCULAR; INTRAVENOUS; SUBCUTANEOUS at 00:46

## 2024-11-07 RX ADMIN — CARVEDILOL 12.5 MG: 12.5 TABLET, FILM COATED ORAL at 16:57

## 2024-11-07 RX ADMIN — SENNOSIDES AND DOCUSATE SODIUM 1 TABLET: 50; 8.6 TABLET ORAL at 09:37

## 2024-11-07 RX ADMIN — ACETAMINOPHEN 650 MG: 325 TABLET ORAL at 09:36

## 2024-11-07 RX ADMIN — HYDROMORPHONE HYDROCHLORIDE 0.5 MG: 1 INJECTION, SOLUTION INTRAMUSCULAR; INTRAVENOUS; SUBCUTANEOUS at 21:47

## 2024-11-07 RX ADMIN — SODIUM CHLORIDE, PRESERVATIVE FREE 10 ML: 5 INJECTION INTRAVENOUS at 09:44

## 2024-11-07 RX ADMIN — ACETAMINOPHEN 650 MG: 325 TABLET ORAL at 21:28

## 2024-11-07 RX ADMIN — GABAPENTIN 100 MG: 100 CAPSULE ORAL at 14:59

## 2024-11-07 RX ADMIN — DIAZEPAM 5 MG: 5 TABLET ORAL at 01:57

## 2024-11-07 RX ADMIN — METHOCARBAMOL 1000 MG: 100 INJECTION INTRAMUSCULAR; INTRAVENOUS at 09:37

## 2024-11-07 RX ADMIN — CARVEDILOL 12.5 MG: 12.5 TABLET, FILM COATED ORAL at 09:36

## 2024-11-07 RX ADMIN — METHOCARBAMOL 1000 MG: 100 INJECTION INTRAMUSCULAR; INTRAVENOUS at 15:11

## 2024-11-07 RX ADMIN — GABAPENTIN 100 MG: 100 CAPSULE ORAL at 21:28

## 2024-11-07 RX ADMIN — OXYCODONE 10 MG: 5 TABLET ORAL at 07:38

## 2024-11-07 RX ADMIN — POLYETHYLENE GLYCOL 3350 17 G: 17 POWDER, FOR SOLUTION ORAL at 09:38

## 2024-11-07 RX ADMIN — SODIUM CHLORIDE, PRESERVATIVE FREE 10 ML: 5 INJECTION INTRAVENOUS at 00:46

## 2024-11-07 ASSESSMENT — PAIN SCALES - GENERAL
PAINLEVEL_OUTOF10: 6
PAINLEVEL_OUTOF10: 7
PAINLEVEL_OUTOF10: 5
PAINLEVEL_OUTOF10: 0
PAINLEVEL_OUTOF10: 0
PAINLEVEL_OUTOF10: 7
PAINLEVEL_OUTOF10: 0
PAINLEVEL_OUTOF10: 6
PAINLEVEL_OUTOF10: 0
PAINLEVEL_OUTOF10: 7
PAINLEVEL_OUTOF10: 8
PAINLEVEL_OUTOF10: 0
PAINLEVEL_OUTOF10: 8

## 2024-11-07 ASSESSMENT — PAIN DESCRIPTION - LOCATION
LOCATION: HAND
LOCATION: HAND
LOCATION: BACK;NECK
LOCATION: ARM;NECK
LOCATION: NECK;BACK
LOCATION: NECK;BACK

## 2024-11-07 ASSESSMENT — PAIN DESCRIPTION - ONSET
ONSET: ON-GOING

## 2024-11-07 ASSESSMENT — PAIN DESCRIPTION - DESCRIPTORS
DESCRIPTORS: ACHING;DISCOMFORT
DESCRIPTORS: STABBING;ACHING

## 2024-11-07 ASSESSMENT — PAIN DESCRIPTION - PAIN TYPE
TYPE: CHRONIC PAIN

## 2024-11-07 ASSESSMENT — PAIN - FUNCTIONAL ASSESSMENT
PAIN_FUNCTIONAL_ASSESSMENT: ACTIVITIES ARE NOT PREVENTED

## 2024-11-07 ASSESSMENT — PAIN DESCRIPTION - ORIENTATION
ORIENTATION: RIGHT;LEFT

## 2024-11-07 ASSESSMENT — PAIN DESCRIPTION - FREQUENCY
FREQUENCY: INTERMITTENT
FREQUENCY: CONTINUOUS
FREQUENCY: INTERMITTENT

## 2024-11-07 NOTE — PROGRESS NOTES
NEUROSURGERY     XAVIER STUBBS   [id number]   1950   11/7/2024    Interval History:  Hospital Day #2      Subjective: Pt c/o of shooting pain down arms , likely neuropathic pain from cervical compression                                                                                                                                                                                                                                                                                                                                                                                                                                                                                                                                                                                                                                                                                                                                                                                                Objective:  /84   Pulse 89   Temp 98.2 °F (36.8 °C) (Oral)   Resp 18   Ht 1.854 m (6' 1\")   Wt 85.1 kg (187 lb 9.8 oz)   SpO2 99%   BMI 24.75 kg/m²     Labs:  Recent Labs     11/05/24  1810 11/06/24  1046 11/07/24  1023    138 140   * 110 108   CO2 18* 22 25   BUN 31* 29* 34*   CREATININE 2.1* 1.9* 2.2*   GLUCOSE 109* 81 90     Recent Labs     11/05/24  1602 11/05/24  1810 11/06/24  1046 11/07/24  1023   WBC  --  1.0* 1.0* 1.0*   RBC  --  4.14* 3.90* 4.24   INR 1.25*  --   --   --        Neurologic Exam:  GCS:  4 - Opens eyes on own  5 - Alert and oriented  6 - Follows simple motor commands    Mental Status: Awake, alert, oriented x 4, speech clear and appropriate  Language: No aphasia or dysarthria noted  Sensation: Intact to all extremities to light touch  Coordination: Intact      Musculoskeletal:   Gait: Not tested   Tone: normal  Motor strength:              Right  Left      Right  Left    Deltoid  3 3   Hip Flex  5 5

## 2024-11-07 NOTE — CARE COORDINATION
2:21 PM    Pt is from home w/spouse. Pt is indp at baseline. Pt has no current services at home. Pt will dc home w/Op therapy.     Electronically signed by CLIFF Hernandez, TRINA, LULU-EVELIN on 11/7/2024 at 2:21 PM  [phone]

## 2024-11-07 NOTE — PROGRESS NOTES
V2.0    [facility] [place] Progress Note      Name:  Barrera Carbone /Age/Sex: 1950  (74 y.o. male)   MRN & CSN:  [id number] & [id number] Encounter Date/Time: 2024 6:25 AM EST   Location:  [address] [place] [address] PCP: No primary care provider on file.     Attending:Colten Prater MD       Hospital Day: 3    Assessment and Recommendations       Assessment/Plan:      Pancytopenia:  Patient currently neutropenic and thrombocytopenic  Reviewed note from hematology appreciate their input  Reviewed note from neurosurgery they would want platelet count greater than 100,000 prior to surgery    Cervical spine degenerative disc disease:  Reviewed note from neurosurgery  Ideally with platelets greater than 100,000  Patient with marked increased pain this morning  Currently on p.o. medication as needed  Additionally IV Dilaudid as needed ordered will monitor for excessive sedation and or respiratory depression     Proximal atrial fibrillation:  Currently in sinus rhythm     CHF: Without exacerbation type unknown  Coreg 12.5 mg p.o. twice daily     Cirrhosis of liver due to chronic hepatitis C:  Has been addressed     COPD: Without exacerbation  Currently not taking any medications     Diabetes mellitus type 2:  Apparently diet controlled at this time          CKD stage IV:  Creatinine 2.1 [facility]     Opioid dependence on agonist therapy:  Methadone     Hypertension:  Continue Coreg 12.5 mg twice daily     Diabetes mellitus type 2:  Noted diagnosis of [facility] diet controlled     Coronary disease:  [facility]ed [facility] note     History of hepatic encephalopathy:  Rifaximin 400 mg 3 times daily     BPH:  Unclear as to his medication     Tobacco abuse:     Cannabinoid abuse:    Disposition: Reviewed with case management patient will probably need placement if we get surgery done    MDM       [x] High (any 2)     A. Problems (any 1)  [] Acute/Chronic Illness/injury posing threat to life or bodily

## 2024-11-07 NOTE — PROGRESS NOTES
Patient is A&O x4.  RA, sat 97%.  No complaints of SOB.  Medicated as needed for pain.   Neurological assessment as documented in doc flow sheets.  Vital signs stable as charted.  Respirations appear to easy and unlabored.  Lungs clear.  Respirations easy with no complaints of cough.  No complaints of nausea/vomiting/diarrhea.  Up with assist and walker to the bathroom or chair as needed.  Tolerating regular diet.   Right and left FA PIVs intact and flushed.  Coccyx red, encouraged turning.  On specialty low air loss mattress.    Plan of care and safety measures reviewed with the patient.  Call light in reach and bed alarm in place.  Bed attached to wall for alarm purposes.  Will continue to monitor.   Electronically signed by Larissa Daly RN on 11/7/2024 at 1:28 AM
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
## 2024-11-07 NOTE — PLAN OF CARE
Problem: Chronic Conditions and Co-morbidities  Goal: Patient's chronic conditions and co-morbidity symptoms are monitored and maintained or improved  Outcome: Progressing  Flowsheets (Taken 11/7/2024 0936)  Care Plan - Patient's Chronic Conditions and Co-Morbidity Symptoms are Monitored and Maintained or Improved:   Monitor and assess patient's chronic conditions and comorbid symptoms for stability, deterioration, or improvement   Collaborate with multidisciplinary team to address chronic and comorbid conditions and prevent exacerbation or deterioration     Problem: Discharge Planning  Goal: Discharge to home or other facility with appropriate resources  Outcome: Progressing  Flowsheets (Taken 11/7/2024 1336)  Discharge to home or other facility with appropriate resources: Identify barriers to discharge with patient and caregiver     Problem: Pain  Goal: Verbalizes/displays adequate comfort level or baseline comfort level  Outcome: Progressing  Flowsheets  Taken 11/7/2024 1458  Verbalizes/displays adequate comfort level or baseline comfort level:   Encourage patient to monitor pain and request assistance   Assess pain using appropriate pain scale   Administer analgesics based on type and severity of pain and evaluate response  Taken 11/7/2024 0820  Verbalizes/displays adequate comfort level or baseline comfort level:   Encourage patient to monitor pain and request assistance   Assess pain using appropriate pain scale   Administer analgesics based on type and severity of pain and evaluate response     Problem: Safety - Adult  Goal: Free from fall injury  Outcome: Progressing

## 2024-11-07 NOTE — PROGRESS NOTES
biopsy.  Recommend outpatient follow-up and will arrange for bone marrow biopsy if needed as an outpatient  -At this time, patient needs surgery.  -Will start him on dexamethasone 40 mg p.o. daily for 4 days and monitor the counts   - patient will need platelet transfusion prior to the surgery if the counts are not yet  K    Hepatitis- C, liver cirrhosis  -Treated in the past.  -Ordered for hepatitis C viral load  -Ultrasound of the abdomen revealed enlarged liver and    Cervical myelopathy  -Plan for CT 3-6 posterior cervical decompression fusion and fixation by neurosurgery    CKD  -Low GFR with elevated creatinine  -Management per primary team      ONCOLOGIC DISPOSITION:    Follow-up as an outpatient after discharge    VIJAY MALONE MD  Please contact through [employer]

## 2024-11-07 NOTE — CONSULTS
Clinical Pharmacy Progress Note    Assessment/Plan:  1)  Pain regimen recommendations:  Recommend continuing Acetaminophen 650mg po BID as ordered.  Pt does have h/o cirrhosis, but this low dose should be safe for now to provide synergy with opioid therapy.  Recommend continuing scheduled Methocarbamol and PRN Diazepam for spasms as ordered.  Since patient is on chronic Methadone therapy, higher doses of opioids will likely be needed for breakthrough pain.    Would consider increasing OxyIR to 10-15mg po q4h prn breakthrough pain.  If OxyIR continues to be ineffective, could consider changing to Hydromorphone PO 4mg po q4h prn.  Since patient reports pain as shooting down his arms, could consider adding Gabapentin if nerve pain is suspected.  Would start with low dose (100mg po TID).    Will continue to monitor and assist with adjustments to regimen as needed.    Please call with questions--  Thanks--  Flora Espinoza, PharmD, BCPS, BCGP  [id number] ([facility])   11/7/2024 10:45 AM      _________________________________________________________    Subjective/Objective:  Pt is a 74yoM with a PMHx that includes CAD, renal cancer, cirrhosis, DM, HF, thrombocytopenia who was admitted initially for C3-C6 cervical decompression fusion and fixation but was found to have platelet count < 50k and surgery was cancelled.  Heme/Onc now following.    Pharmacy has been consulted to make pain medication recommendations by CHARISSA Juarez.    11/7:  Pt seen at bedside.  Reports frustration with pain.  States pain is in his neck and shoots down his arms, sometimes shooting pain and sometimes stabbing pain.  Also reports spasms.  Also reports some pain in his lower legs, but seemed more bothered by the pain in his neck / arms.  States the Hydromorphone works the best, and that the Oxycodone doesn't seem to have any effect at all.  Asked if he has ever used Lidocaine patches on his neck - states they do not help with the

## 2024-11-08 LAB
ALBUMIN SERPL-MCNC: 3.8 G/DL (ref 3.4–5)
AMMONIA PLAS-SCNC: 24 UMOL/L (ref 16–60)
ANION GAP SERPL CALCULATED.3IONS-SCNC: 10 MMOL/L (ref 3–16)
BUN SERPL-MCNC: 37 MG/DL (ref 7–20)
CALCIUM SERPL-MCNC: 9.2 MG/DL (ref 8.3–10.6)
CHLORIDE SERPL-SCNC: 106 MMOL/L (ref 99–110)
CO2 SERPL-SCNC: 18 MMOL/L (ref 21–32)
CREAT SERPL-MCNC: 2 MG/DL (ref 0.8–1.3)
DEPRECATED RDW RBC AUTO: 16.8 % (ref 12.4–15.4)
EPO SERPL-ACNC: 12 MU/ML (ref 4–27)
GFR SERPLBLD CREATININE-BSD FMLA CKD-EPI: 35 ML/MIN/{1.73_M2}
GLUCOSE BLD-MCNC: 158 MG/DL (ref 70–99)
GLUCOSE BLD-MCNC: 167 MG/DL (ref 70–99)
GLUCOSE SERPL-MCNC: 230 MG/DL (ref 70–99)
HCT VFR BLD AUTO: 36.1 % (ref 40.5–52.5)
HGB BLD-MCNC: 11.3 G/DL (ref 13.5–17.5)
MCH RBC QN AUTO: 27.3 PG (ref 26–34)
MCHC RBC AUTO-ENTMCNC: 31.3 G/DL (ref 31–36)
MCV RBC AUTO: 87.1 FL (ref 80–100)
PERFORMED ON: ABNORMAL
PERFORMED ON: ABNORMAL
PHOSPHATE SERPL-MCNC: 3 MG/DL (ref 2.5–4.9)
PLATELET # BLD AUTO: 57 K/UL (ref 135–450)
PMV BLD AUTO: 10.8 FL (ref 5–10.5)
POTASSIUM SERPL-SCNC: 4.6 MMOL/L (ref 3.5–5.1)
RBC # BLD AUTO: 4.14 M/UL (ref 4.2–5.9)
SODIUM SERPL-SCNC: 134 MMOL/L (ref 136–145)
WBC # BLD AUTO: 1 K/UL (ref 4–11)

## 2024-11-08 PROCEDURE — 6370000000 HC RX 637 (ALT 250 FOR IP): Performed by: NURSE PRACTITIONER

## 2024-11-08 PROCEDURE — 6360000002 HC RX W HCPCS: Performed by: INTERNAL MEDICINE

## 2024-11-08 PROCEDURE — 2580000003 HC RX 258: Performed by: ANESTHESIOLOGY

## 2024-11-08 PROCEDURE — 6360000002 HC RX W HCPCS: Performed by: NURSE PRACTITIONER

## 2024-11-08 PROCEDURE — 99232 SBSQ HOSP IP/OBS MODERATE 35: CPT | Performed by: NURSE PRACTITIONER

## 2024-11-08 PROCEDURE — APPNB30 APP NON BILLABLE TIME 0-30 MINS: Performed by: NURSE PRACTITIONER

## 2024-11-08 PROCEDURE — 36415 COLL VENOUS BLD VENIPUNCTURE: CPT

## 2024-11-08 PROCEDURE — 82140 ASSAY OF AMMONIA: CPT

## 2024-11-08 PROCEDURE — 80069 RENAL FUNCTION PANEL: CPT

## 2024-11-08 PROCEDURE — 2060000000 HC ICU INTERMEDIATE R&B

## 2024-11-08 PROCEDURE — 6370000000 HC RX 637 (ALT 250 FOR IP): Performed by: INTERNAL MEDICINE

## 2024-11-08 PROCEDURE — 93005 ELECTROCARDIOGRAM TRACING: CPT | Performed by: INTERNAL MEDICINE

## 2024-11-08 PROCEDURE — 85027 COMPLETE CBC AUTOMATED: CPT

## 2024-11-08 PROCEDURE — 87522 HEPATITIS C REVRS TRNSCRPJ: CPT

## 2024-11-08 PROCEDURE — 97530 THERAPEUTIC ACTIVITIES: CPT

## 2024-11-08 RX ORDER — INSULIN LISPRO 100 [IU]/ML
0-4 INJECTION, SOLUTION INTRAVENOUS; SUBCUTANEOUS
Status: DISCONTINUED | OUTPATIENT
Start: 2024-11-08 | End: 2024-11-15 | Stop reason: HOSPADM

## 2024-11-08 RX ORDER — LACTULOSE 10 G/15ML
30 SOLUTION ORAL 3 TIMES DAILY
Status: DISCONTINUED | OUTPATIENT
Start: 2024-11-08 | End: 2024-11-09

## 2024-11-08 RX ORDER — PANTOPRAZOLE SODIUM 40 MG/1
40 TABLET, DELAYED RELEASE ORAL
Status: DISCONTINUED | OUTPATIENT
Start: 2024-11-08 | End: 2024-11-15 | Stop reason: HOSPADM

## 2024-11-08 RX ADMIN — GABAPENTIN 100 MG: 100 CAPSULE ORAL at 21:13

## 2024-11-08 RX ADMIN — CARVEDILOL 12.5 MG: 12.5 TABLET, FILM COATED ORAL at 16:28

## 2024-11-08 RX ADMIN — RIFAXIMIN 400 MG: 200 TABLET ORAL at 21:31

## 2024-11-08 RX ADMIN — PANTOPRAZOLE SODIUM 40 MG: 40 TABLET, DELAYED RELEASE ORAL at 16:28

## 2024-11-08 RX ADMIN — OXYCODONE 10 MG: 5 TABLET ORAL at 07:06

## 2024-11-08 RX ADMIN — HYDROMORPHONE HYDROCHLORIDE 0.5 MG: 1 INJECTION, SOLUTION INTRAMUSCULAR; INTRAVENOUS; SUBCUTANEOUS at 05:10

## 2024-11-08 RX ADMIN — HYDROMORPHONE HYDROCHLORIDE 0.5 MG: 1 INJECTION, SOLUTION INTRAMUSCULAR; INTRAVENOUS; SUBCUTANEOUS at 08:25

## 2024-11-08 RX ADMIN — HYDROMORPHONE HYDROCHLORIDE 0.5 MG: 1 INJECTION, SOLUTION INTRAMUSCULAR; INTRAVENOUS; SUBCUTANEOUS at 21:38

## 2024-11-08 RX ADMIN — CARVEDILOL 12.5 MG: 12.5 TABLET, FILM COATED ORAL at 08:29

## 2024-11-08 RX ADMIN — METHOCARBAMOL 1000 MG: 100 INJECTION INTRAMUSCULAR; INTRAVENOUS at 05:19

## 2024-11-08 RX ADMIN — SENNOSIDES AND DOCUSATE SODIUM 1 TABLET: 50; 8.6 TABLET ORAL at 21:13

## 2024-11-08 RX ADMIN — METHADONE HYDROCHLORIDE 40 MG: 10 TABLET ORAL at 05:18

## 2024-11-08 RX ADMIN — SENNOSIDES AND DOCUSATE SODIUM 1 TABLET: 50; 8.6 TABLET ORAL at 08:28

## 2024-11-08 RX ADMIN — GABAPENTIN 100 MG: 100 CAPSULE ORAL at 08:29

## 2024-11-08 RX ADMIN — RIFAXIMIN 400 MG: 200 TABLET ORAL at 14:19

## 2024-11-08 RX ADMIN — DEXAMETHASONE 40 MG: 4 TABLET ORAL at 08:31

## 2024-11-08 RX ADMIN — RIFAXIMIN 400 MG: 200 TABLET ORAL at 08:30

## 2024-11-08 RX ADMIN — SODIUM CHLORIDE, PRESERVATIVE FREE 10 ML: 5 INJECTION INTRAVENOUS at 21:40

## 2024-11-08 RX ADMIN — METHOCARBAMOL TABLETS 750 MG: 750 TABLET, COATED ORAL at 21:13

## 2024-11-08 RX ADMIN — GABAPENTIN 100 MG: 100 CAPSULE ORAL at 14:19

## 2024-11-08 RX ADMIN — ACETAMINOPHEN 650 MG: 325 TABLET ORAL at 21:13

## 2024-11-08 RX ADMIN — POLYETHYLENE GLYCOL 3350 17 G: 17 POWDER, FOR SOLUTION ORAL at 08:28

## 2024-11-08 RX ADMIN — OXYCODONE 10 MG: 5 TABLET ORAL at 11:12

## 2024-11-08 RX ADMIN — HYDROMORPHONE HYDROCHLORIDE 0.5 MG: 1 INJECTION, SOLUTION INTRAMUSCULAR; INTRAVENOUS; SUBCUTANEOUS at 14:24

## 2024-11-08 RX ADMIN — SODIUM CHLORIDE, PRESERVATIVE FREE 10 ML: 5 INJECTION INTRAVENOUS at 08:28

## 2024-11-08 RX ADMIN — METHADONE HYDROCHLORIDE 40 MG: 10 TABLET ORAL at 18:03

## 2024-11-08 RX ADMIN — METHOCARBAMOL TABLETS 750 MG: 750 TABLET, COATED ORAL at 14:19

## 2024-11-08 ASSESSMENT — PAIN SCALES - GENERAL
PAINLEVEL_OUTOF10: 7
PAINLEVEL_OUTOF10: 6
PAINLEVEL_OUTOF10: 7
PAINLEVEL_OUTOF10: 8
PAINLEVEL_OUTOF10: 6
PAINLEVEL_OUTOF10: 7
PAINLEVEL_OUTOF10: 7
PAINLEVEL_OUTOF10: 6
PAINLEVEL_OUTOF10: 8
PAINLEVEL_OUTOF10: 0
PAINLEVEL_OUTOF10: 6
PAINLEVEL_OUTOF10: 7
PAINLEVEL_OUTOF10: 6

## 2024-11-08 ASSESSMENT — PAIN DESCRIPTION - DESCRIPTORS
DESCRIPTORS: ACHING
DESCRIPTORS: DISCOMFORT
DESCRIPTORS: POUNDING

## 2024-11-08 ASSESSMENT — PAIN DESCRIPTION - LOCATION
LOCATION: LEG;NECK;BACK
LOCATION: HEAD
LOCATION: NECK;HAND

## 2024-11-08 ASSESSMENT — PAIN DESCRIPTION - PAIN TYPE
TYPE: CHRONIC PAIN
TYPE: CHRONIC PAIN

## 2024-11-08 ASSESSMENT — PAIN DESCRIPTION - ORIENTATION: ORIENTATION: RIGHT;LEFT

## 2024-11-08 ASSESSMENT — PAIN - FUNCTIONAL ASSESSMENT
PAIN_FUNCTIONAL_ASSESSMENT: ACTIVITIES ARE NOT PREVENTED

## 2024-11-08 ASSESSMENT — PAIN DESCRIPTION - ONSET: ONSET: ON-GOING

## 2024-11-08 ASSESSMENT — PAIN DESCRIPTION - FREQUENCY: FREQUENCY: CONTINUOUS

## 2024-11-08 NOTE — CARE COORDINATION
3:46 PM    Pt is from home w/spouse. Pt is indp at baseline. Pt has no current services at home. Pt will dc home w/Op therapy.     Pt will need to purchase this DME op: Long-handled Sponge;Long-handled Shoe Horn;Reacher;Sock-Aid Soft (button hook)     Plan for surgery next week after onc finishes work up.     SW following.  Electronically signed by CLIFF Hernandez, TRINA, LISSET on 11/8/2024 at 3:46 PM  [phone]

## 2024-11-08 NOTE — PROGRESS NOTES
Spiritual Health History and Assessment/Progress Note  [facility]    Initial Encounter, Spiritual/Emotional Needs, (P) Emotional distress, Relationship concerns,  ,      Name: Barrera Carbone MRN: [id number]    Age: 74 y.o.     Sex: male   Language: English   Gnosticist: Unknown   Cervical myelopathy (HCC)     Date: 11/8/2024            Total Time Calculated: (P) 46 min              Spiritual Assessment began in [address]        Referral/Consult From: (P) Nurse (Mak Vargas RN)   Encounter Overview/Reason: Initial Encounter, Spiritual/Emotional Needs  Service Provided For: Patient    Jory, Belief, Meaning:   Patient identifies as spiritual and has beliefs or practices that help with coping during difficult times  Family/Friends are connected with a jory tradition or spiritual practice and No family/friends present      Importance and Influence:  Patient has spiritual/personal beliefs that influence decisions regarding their health      Community:  Patient Other: Expressed he would like to attend a new Restoration[employer] potentially with his grandson    Assessment and Plan of Care:     Patient Interventions include: Facilitated expression of thoughts and feelings and Affirmed coping skills/support systems      Patient Plan of Care: Spiritual Care available upon further referral and Other:  will return with scriptures/other printed material for patient       Electronically signed by Yessica Hope,  Intern on 11/8/2024 at 9:50 AM

## 2024-11-08 NOTE — CONSULTS
Clinical Pharmacy Progress Note    Assessment/Plan:  1)  Pain regimen recommendations:  Recommend continuing Acetaminophen 650mg po BID as ordered.  Pt does have h/o cirrhosis, but this low dose should be safe for now to provide synergy with opioid therapy.  Recommend continuing scheduled Methocarbamol and PRN Diazepam for spasms as ordered.  Since patient is on chronic Methadone therapy, higher doses of opioids will likely be needed for breakthrough pain.    If pain is uncontrolled, could consider increasing OxyIR to 10-15mg po q4h prn breakthrough pain.  If OxyIR is ineffective, could consider changing to Hydromorphone PO 4mg po q4h prn.  Recommend continuing Gabapentin 100mg po TID.    Given CKD, any upward titration would have to be done very cautiously.    As no new recommendations today and pt reporting improvement in pain after adding Gabapentin yesterday, pharmacy will sign off consult.  Please re-consult if further assistance is needed.    Please call with questions--  Thanks--  Flora Espinoza, PharmD, BCPS, BCGP  [id number] ([facility])   11/8/2024 3:35 PM      _________________________________________________________    Subjective/Objective:  Pt is a 74yoM with a PMHx that includes CAD, renal cancer, cirrhosis, DM, HF, thrombocytopenia who was admitted initially for C3-C6 cervical decompression fusion and fixation but was found to have platelet count < 50k and surgery was cancelled.  Heme/Onc now following.    Pharmacy has been consulted to make pain medication recommendations by CHARISSA Juarez.    11/8:  Pt reports improvement in pain since change made yesterday.   11/7:  Pt seen at bedside.  Reports frustration with pain.  States pain is in his neck and shoots down his arms, sometimes shooting pain and sometimes stabbing pain.  Also reports spasms.  Also reports some pain in his lower legs, but seemed more bothered by the pain in his neck / arms.  States the Hydromorphone works the best, and that

## 2024-11-08 NOTE — PROGRESS NOTES
NEUROSURGERY     XAVIER STUBBS   [id number]   1950   11/8/2024    Interval History:  Hospital Day #3      Subjective: Pt c/o of shooting pain down arms , likely neuropathic pain from cervical compression. Pt needs surgery next week once oncology finishes work up.                                                                                                                                                                                                                                                                                                                                                                                                                                                                                                                                                                                                                                                                                                                                                                                                Objective:  /78   Pulse 88   Temp 97.1 °F (36.2 °C) (Oral)   Resp 18   Ht 1.854 m (6' 1\")   Wt 85.1 kg (187 lb 9.8 oz)   SpO2 98%   BMI 24.75 kg/m²     Labs:  Recent Labs     11/06/24  1046 11/07/24  1023 11/08/24  0415    140 134*    108 106   CO2 22 25 18*   BUN 29* 34* 37*   CREATININE 1.9* 2.2* 2.0*   GLUCOSE 81 90 230*     Recent Labs     11/05/24  1602 11/05/24  1810 11/06/24  1046 11/07/24  1023 11/08/24  0415   WBC  --    < > 1.0* 1.0* 1.0*   RBC  --    < > 3.90* 4.24 4.14*   INR 1.25*  --   --   --   --     < > = values in this interval not displayed.       Neurologic Exam:  GCS:  4 - Opens eyes on own  5 - Alert and oriented  6 - Follows simple motor commands    Mental Status: Awake, alert, oriented x 4, speech clear and appropriate  Language: No aphasia or dysarthria noted  Sensation: Intact to all extremities to light touch  Coordination:

## 2024-11-08 NOTE — PROGRESS NOTES
Occupational Therapy  Facility/Department: [address]  Daily Treatment Note  NAME: Barrera Carbone  : 1950  MRN: [id number]    Date of Service: 2024    Discharge Recommendations:  24 hour supervision or assist  OT Equipment Recommendations  Equipment Needed: Yes  Mobility Devices: ADL Assistive Devices  ADL Assistive Devices: Long-handled Sponge;Long-handled Shoe Horn;Reacher;Sock-Aid Soft (button hook)      Patient Diagnosis(es): There were no encounter diagnoses.    Assessment   Assessment: Pt completes functional mobility in room and in hallway ~100' using RW with CGA, verbal cues for maintaining promixity to RW and upright posture. Session limited by pain with pt declining to complete ADLs. Pt verbalizes understanding of AE for dressing and plans to purchase for use at home to promote independence with ADLs. Cont OT POC.  Activity Tolerance: Patient tolerated treatment well;Patient limited by pain;Patient limited by endurance  Discharge Recommendations: 24 hour supervision or assist  Equipment Needed: Yes  Mobility Devices: ADL Assistive Devices     Plan  Occupational Therapy Plan  Times Per Week: 2-5  Current Treatment Recommendations: Strengthening;Balance training;Functional mobility training;Safety education & training;Equipment evaluation, education, & procurement;Self-Care / ADL    Restrictions  Position Activity Restriction  Other position/activity restrictions: Up w/ assist    Subjective  Subjective  Subjective: Pt seated EOB upon arrival, pt reporting unrated pain with RN administering all pain meds. Pt asking to walk but decliened to perform ADLs due to elevated pain.  Orientation  Overall Orientation Status: Within Functional Limits  Cognition  Overall Cognitive Status: WFL       Objective  Vitals     Balance  Sitting: Intact  Standing: High guard;With support (CGA at RW)  Transfer Training  Transfer Training: Yes  Sit to Stand: Contact-guard assistance;Stand-by assistance  Stand to  Sit: Stand-by assistance;Contact-guard assistance  Gait  Gait Training: Yes  Distance (ft): 100 Feet     ADL  Functional Mobility: Contact guard assistance  Functional Mobility Skilled Clinical Factors: Pt ambulates ~100' from room and in hallway with 2x turns using RW with CGA overall, min verbal cues for upright trunk position and proximity to RW  Additional Comments: Pt expressed interest in AE for dressing, declined to practice with equipment due to pain at this time        Safety Devices  Type of Devices: Nurse notified;Gait belt;Left in bed (seated EOB with RN aware)    Patient Education  Education Given To: Patient  Education Provided: Role of Therapy;Plan of Care;Transfer Training  Education Method: Verbal  Barriers to Learning: None  Education Outcome: Verbalized understanding;Continued education needed    Goals  Short Term Goals  Time Frame for Short Term Goals: Discharge  Short Term Goal 1: 3in1 transfer w/ Supervision  Short Term Goal 2: don pants using AE w/ Supervision  Short Term Goal 3: don socks using AE w/ Supervision  Short Term Goal 4: grooming tasks at sink level in standing w/ SBA  Patient Goals   Patient goals : wants to go on a cruise with his wife    AM-PAC - ADL  AM-PAC Daily Activity - Inpatient   How much help is needed for putting on and taking off regular lower body clothing?: A Lot  How much help is needed for bathing (which includes washing, rinsing, drying)?: A Lot  How much help is needed for toileting (which includes using toilet, bedpan, or urinal)?: A Little  How much help is needed for putting on and taking off regular upper body clothing?: A Little  How much help is needed for taking care of personal grooming?: A Little  How much help for eating meals?: None  AM-[facility] Inpatient Daily Activity Raw Score: 17  AM-PAC Inpatient ADL T-Scale Score : 37.26  ADL Inpatient CMS 0-100% Score: 50.11  ADL Inpatient CMS G-Code Modifier : CK    Therapy Time   Individual Concurrent Group

## 2024-11-08 NOTE — PROGRESS NOTES
V2.0    [facility] [place]      Name:  Barrera Carbone /Age/Sex: 1950  (74 y.o. male)   MRN & CSN:  [id number] & [id number] Encounter Date/Time: 2024 6:25 AM EST   Location:  [address] [place] [address] PCP: No primary care provider on file.     Attending:Colten Prater MD       Hospital Day: 4    Assessment and Recommendations       Assessment/Plan:      Pancytopenia:  Reviewed note from oncology greatly appreciate their input  Patient has a allergy to interferon so therefore cannot start him on G-CSF  Patient was started on dexamethasone 40 mg daily    Thrombocytopenia:  As noted by oncology/hematology likely secondary to peripheral sequestration in addition by splenomegaly versus ITP.  Appears that at least 2 lines of hematological parameters are abnormal and the possibility of underlying MDS/MPD cannot be completely ruled out  The diagnosis would require bone marrow aspiration and biopsy which could be done outpatient  Given the fact patient is in need of surgery patient is being started on dexamethasone 40 mg daily for 4 days and monitor platelet count  Patient will need transfusions prior to surgery if his counts are not in the  K region    Liver cirrhosis secondary to hepatitis C:  Treated in the past  Ultrasound of his abdomen has revealed an enlarged liver  Hepatitis C viral load has been ordered    Cervical spine degenerative disc disease:  Plan for CT T3-6 posterior cervical decompression fusion and fixation per neurosurgery  Patient with marked increased pain yesterday morning   Currently on p.o. medication as needed  Additionally IV Dilaudid as needed ordered will monitor for excessive sedation and or respiratory depression  Patient started on gabapentin 100 mg 3 times daily  Anticipate going to surgery next week  Goal is resolved neutropenia and have platelets greater than 100,000  PT/OT     Proximal atrial fibrillation:  Currently in sinus rhythm     CHF: Without exacerbation type      Lipids: No results found for: \"CHOL\", \"HDL\", \"TRIG\"  Hemoglobin A1C: No results found for: \"LABA1C\"  TSH: No results found for: \"TSH\"  Troponin: No results found for: \"TROPONINT\"  Lactic Acid: No results for input(s): \"LACTA\" in the last 72 hours.  BNP: No results for input(s): \"PROBNP\" in the last 72 hours.  UA:No results found for: \"NITRU\", \"COLORU\", \"PHUR\", \"LABCAST\", \"WBCUA\", \"RBCUA\", \"MUCUS\", \"TRICHOMONAS\", \"YEAST\", \"BACTERIA\", \"CLARITYU\", \"SPECGRAV\", \"LEUKOCYTESUR\", \"UROBILINOGEN\", \"BILIRUBINUR\", \"BLOODU\", \"GLUCOSEU\", \"KETUA\", \"AMORPHOUS\"  Urine Cultures: No results found for: \"LABURIN\"  Blood Cultures: No results found for: \"BC\"  No results found for: \"BLOODCULT2\"  Organism: No results found for: \"ORG\"      Electronically signed by Jose Armando Wagner MD on 11/8/2024 at 6:49 AM      Comment: Please note this report has been produced using speech recognition software and may contain errors related to that system including errors in grammar, punctuation, and spelling, as well as words and phrases that may be inappropriate. If there are any questions or concerns please feel free to contact the dictating provider for clarification.

## 2024-11-09 ENCOUNTER — APPOINTMENT (OUTPATIENT)
Age: 74
End: 2024-11-09
Attending: INTERNAL MEDICINE
Payer: OTHER GOVERNMENT

## 2024-11-09 LAB
ALBUMIN SERPL-MCNC: 3.7 G/DL (ref 3.4–5)
ANION GAP SERPL CALCULATED.3IONS-SCNC: 10 MMOL/L (ref 3–16)
BASOPHILS # BLD: 0 K/UL (ref 0–0.2)
BASOPHILS NFR BLD: 0.1 %
BUN SERPL-MCNC: 45 MG/DL (ref 7–20)
CALCIUM SERPL-MCNC: 9.4 MG/DL (ref 8.3–10.6)
CHLORIDE SERPL-SCNC: 107 MMOL/L (ref 99–110)
CO2 SERPL-SCNC: 18 MMOL/L (ref 21–32)
COPPER SERPL-MCNC: 77.8 UG/DL (ref 70–140)
CREAT SERPL-MCNC: 2 MG/DL (ref 0.8–1.3)
DEPRECATED RDW RBC AUTO: 16.5 % (ref 12.4–15.4)
ECHO AO ASC DIAM: 4.3 CM
ECHO AO ASCENDING AORTA INDEX: 2.06 CM/M2
ECHO AO ROOT DIAM: 4.5 CM
ECHO AO ROOT INDEX: 2.15 CM/M2
ECHO AR MAX VEL PISA: 4.6 M/S
ECHO AV AREA PEAK VELOCITY: 3.2 CM2
ECHO AV AREA/BSA PEAK VELOCITY: 1.5 CM2/M2
ECHO AV EROA PISA: 0.1 CM2
ECHO AV PEAK GRADIENT: 4 MMHG
ECHO AV PEAK VELOCITY: 1.1 M/S
ECHO AV REGURGITANT PHT: 505 MS
ECHO AV VELOCITY RATIO: 0.73
ECHO BSA: 2.09 M2
ECHO IVC INSP: 1.4 CM
ECHO IVC PROX: 2.1 CM
ECHO LA AREA 2C: 44.4 CM2
ECHO LA AREA 4C: 36.2 CM2
ECHO LA MAJOR AXIS: 7.3 CM
ECHO LA MINOR AXIS: 8.4 CM
ECHO LA VOL BP: 183 ML (ref 18–58)
ECHO LA VOL MOD A2C: 200 ML (ref 18–58)
ECHO LA VOL MOD A4C: 146 ML (ref 18–58)
ECHO LA VOL/BSA BIPLANE: 88 ML/M2 (ref 16–34)
ECHO LA VOLUME INDEX MOD A2C: 96 ML/M2 (ref 16–34)
ECHO LA VOLUME INDEX MOD A4C: 70 ML/M2 (ref 16–34)
ECHO LV EDV A2C: 200 ML
ECHO LV EDV A4C: 223 ML
ECHO LV EDV INDEX A4C: 107 ML/M2
ECHO LV EDV NDEX A2C: 96 ML/M2
ECHO LV EJECTION FRACTION A2C: 40 %
ECHO LV EJECTION FRACTION A4C: 37 %
ECHO LV EJECTION FRACTION BIPLANE: 39 % (ref 55–100)
ECHO LV ESV A2C: 121 ML
ECHO LV ESV A4C: 141 ML
ECHO LV ESV INDEX A2C: 58 ML/M2
ECHO LV ESV INDEX A4C: 67 ML/M2
ECHO LV FRACTIONAL SHORTENING: 17 % (ref 28–44)
ECHO LV INTERNAL DIMENSION DIASTOLE INDEX: 3.06 CM/M2
ECHO LV INTERNAL DIMENSION DIASTOLIC: 6.4 CM (ref 4.2–5.9)
ECHO LV INTERNAL DIMENSION SYSTOLIC INDEX: 2.54 CM/M2
ECHO LV INTERNAL DIMENSION SYSTOLIC: 5.3 CM
ECHO LV IVSD: 1 CM (ref 0.6–1)
ECHO LV MASS 2D: 258.2 G (ref 88–224)
ECHO LV MASS INDEX 2D: 123.6 G/M2 (ref 49–115)
ECHO LV POSTERIOR WALL DIASTOLIC: 0.9 CM (ref 0.6–1)
ECHO LV RELATIVE WALL THICKNESS RATIO: 0.28
ECHO LVOT AREA: 4.5 CM2
ECHO LVOT DIAM: 2.4 CM
ECHO LVOT MEAN GRADIENT: 1 MMHG
ECHO LVOT PEAK GRADIENT: 2 MMHG
ECHO LVOT PEAK VELOCITY: 0.8 M/S
ECHO LVOT STROKE VOLUME INDEX: 30.1 ML/M2
ECHO LVOT SV: 62.9 ML
ECHO LVOT VTI: 13.9 CM
ECHO MV E DECELERATION TIME (DT): 192 MS
ECHO MV E VELOCITY: 0.88 M/S
ECHO PV MAX VELOCITY: 0.7 M/S
ECHO PV PEAK GRADIENT: 2 MMHG
ECHO RA AREA 4C: 34.3 CM2
ECHO RA END SYSTOLIC VOLUME APICAL 4 CHAMBER INDEX BSA: 64 ML/M2
ECHO RA VOLUME: 133 ML
ECHO RV BASAL DIMENSION: 4.3 CM
ECHO RV FREE WALL PEAK S': 9.9 CM/S
ECHO RV MID DIMENSION: 2.9 CM
ECHO RV TAPSE: 2 CM (ref 1.7–?)
EKG ATRIAL RATE: 394 BPM
EKG DIAGNOSIS: NORMAL
EKG DIAGNOSIS: NORMAL
EKG Q-T INTERVAL: 356 MS
EKG Q-T INTERVAL: 356 MS
EKG QRS DURATION: 86 MS
EKG QRS DURATION: 92 MS
EKG QTC CALCULATION (BAZETT): 405 MS
EKG QTC CALCULATION (BAZETT): 435 MS
EKG R AXIS: -14 DEGREES
EKG R AXIS: -17 DEGREES
EKG T AXIS: 219 DEGREES
EKG T AXIS: 237 DEGREES
EKG VENTRICULAR RATE: 78 BPM
EKG VENTRICULAR RATE: 90 BPM
EOSINOPHIL # BLD: 0 K/UL (ref 0–0.6)
EOSINOPHIL NFR BLD: 0.1 %
GFR SERPLBLD CREATININE-BSD FMLA CKD-EPI: 35 ML/MIN/{1.73_M2}
GLUCOSE BLD-MCNC: 122 MG/DL (ref 70–99)
GLUCOSE BLD-MCNC: 141 MG/DL (ref 70–99)
GLUCOSE BLD-MCNC: 181 MG/DL (ref 70–99)
GLUCOSE BLD-MCNC: 202 MG/DL (ref 70–99)
GLUCOSE SERPL-MCNC: 170 MG/DL (ref 70–99)
HCT VFR BLD AUTO: 34.3 % (ref 40.5–52.5)
HGB BLD-MCNC: 11 G/DL (ref 13.5–17.5)
LYMPHOCYTES # BLD: 0.1 K/UL (ref 1–5.1)
LYMPHOCYTES NFR BLD: 8.4 %
MCH RBC QN AUTO: 27.6 PG (ref 26–34)
MCHC RBC AUTO-ENTMCNC: 32 G/DL (ref 31–36)
MCV RBC AUTO: 86 FL (ref 80–100)
MONOCYTES # BLD: 0.1 K/UL (ref 0–1.3)
MONOCYTES NFR BLD: 5.6 %
NEUTROPHILS # BLD: 1.5 K/UL (ref 1.7–7.7)
NEUTROPHILS NFR BLD: 85.8 %
PERFORMED ON: ABNORMAL
PHOSPHATE SERPL-MCNC: 2.9 MG/DL (ref 2.5–4.9)
PLATELET # BLD AUTO: 59 K/UL (ref 135–450)
PMV BLD AUTO: 10.1 FL (ref 5–10.5)
POTASSIUM SERPL-SCNC: 5 MMOL/L (ref 3.5–5.1)
RBC # BLD AUTO: 3.98 M/UL (ref 4.2–5.9)
SODIUM SERPL-SCNC: 135 MMOL/L (ref 136–145)
WBC # BLD AUTO: 1.7 K/UL (ref 4–11)
ZINC SERPL-MCNC: 65.8 UG/DL (ref 60–120)

## 2024-11-09 PROCEDURE — C8929 TTE W OR WO FOL WCON,DOPPLER: HCPCS

## 2024-11-09 PROCEDURE — 97530 THERAPEUTIC ACTIVITIES: CPT

## 2024-11-09 PROCEDURE — 97112 NEUROMUSCULAR REEDUCATION: CPT

## 2024-11-09 PROCEDURE — 2060000000 HC ICU INTERMEDIATE R&B

## 2024-11-09 PROCEDURE — 93005 ELECTROCARDIOGRAM TRACING: CPT | Performed by: INTERNAL MEDICINE

## 2024-11-09 PROCEDURE — 97116 GAIT TRAINING THERAPY: CPT

## 2024-11-09 PROCEDURE — 6360000002 HC RX W HCPCS: Performed by: NURSE PRACTITIONER

## 2024-11-09 PROCEDURE — 6370000000 HC RX 637 (ALT 250 FOR IP): Performed by: NURSE PRACTITIONER

## 2024-11-09 PROCEDURE — 2580000003 HC RX 258: Performed by: ANESTHESIOLOGY

## 2024-11-09 PROCEDURE — 6360000002 HC RX W HCPCS: Performed by: INTERNAL MEDICINE

## 2024-11-09 PROCEDURE — 80069 RENAL FUNCTION PANEL: CPT

## 2024-11-09 PROCEDURE — 85025 COMPLETE CBC W/AUTO DIFF WBC: CPT

## 2024-11-09 PROCEDURE — 93306 TTE W/DOPPLER COMPLETE: CPT | Performed by: INTERNAL MEDICINE

## 2024-11-09 PROCEDURE — 6370000000 HC RX 637 (ALT 250 FOR IP): Performed by: INTERNAL MEDICINE

## 2024-11-09 PROCEDURE — 6360000004 HC RX CONTRAST MEDICATION: Performed by: INTERNAL MEDICINE

## 2024-11-09 PROCEDURE — 36415 COLL VENOUS BLD VENIPUNCTURE: CPT

## 2024-11-09 RX ORDER — LACTULOSE 10 G/15ML
20 SOLUTION ORAL 2 TIMES DAILY
Status: DISCONTINUED | OUTPATIENT
Start: 2024-11-09 | End: 2024-11-15 | Stop reason: HOSPADM

## 2024-11-09 RX ADMIN — INSULIN LISPRO 1 UNITS: 100 INJECTION, SOLUTION INTRAVENOUS; SUBCUTANEOUS at 20:51

## 2024-11-09 RX ADMIN — PANTOPRAZOLE SODIUM 40 MG: 40 TABLET, DELAYED RELEASE ORAL at 15:27

## 2024-11-09 RX ADMIN — RIFAXIMIN 400 MG: 200 TABLET ORAL at 15:27

## 2024-11-09 RX ADMIN — SODIUM CHLORIDE, PRESERVATIVE FREE 10 ML: 5 INJECTION INTRAVENOUS at 20:52

## 2024-11-09 RX ADMIN — SODIUM CHLORIDE, PRESERVATIVE FREE 10 ML: 5 INJECTION INTRAVENOUS at 08:53

## 2024-11-09 RX ADMIN — RIFAXIMIN 400 MG: 200 TABLET ORAL at 09:00

## 2024-11-09 RX ADMIN — HYDROMORPHONE HYDROCHLORIDE 0.5 MG: 1 INJECTION, SOLUTION INTRAMUSCULAR; INTRAVENOUS; SUBCUTANEOUS at 10:59

## 2024-11-09 RX ADMIN — METHADONE HYDROCHLORIDE 40 MG: 10 TABLET ORAL at 08:52

## 2024-11-09 RX ADMIN — INSULIN LISPRO 1 UNITS: 100 INJECTION, SOLUTION INTRAVENOUS; SUBCUTANEOUS at 17:11

## 2024-11-09 RX ADMIN — SENNOSIDES AND DOCUSATE SODIUM 1 TABLET: 50; 8.6 TABLET ORAL at 08:52

## 2024-11-09 RX ADMIN — CARVEDILOL 12.5 MG: 12.5 TABLET, FILM COATED ORAL at 08:52

## 2024-11-09 RX ADMIN — METHOCARBAMOL TABLETS 750 MG: 750 TABLET, COATED ORAL at 15:27

## 2024-11-09 RX ADMIN — POLYETHYLENE GLYCOL 3350 17 G: 17 POWDER, FOR SOLUTION ORAL at 08:54

## 2024-11-09 RX ADMIN — OXYCODONE 10 MG: 5 TABLET ORAL at 08:51

## 2024-11-09 RX ADMIN — SENNOSIDES AND DOCUSATE SODIUM 1 TABLET: 50; 8.6 TABLET ORAL at 20:34

## 2024-11-09 RX ADMIN — OXYCODONE 10 MG: 5 TABLET ORAL at 20:34

## 2024-11-09 RX ADMIN — METHADONE HYDROCHLORIDE 40 MG: 10 TABLET ORAL at 17:10

## 2024-11-09 RX ADMIN — OXYCODONE 10 MG: 5 TABLET ORAL at 15:27

## 2024-11-09 RX ADMIN — METHOCARBAMOL TABLETS 750 MG: 750 TABLET, COATED ORAL at 20:34

## 2024-11-09 RX ADMIN — GABAPENTIN 100 MG: 100 CAPSULE ORAL at 15:27

## 2024-11-09 RX ADMIN — METHOCARBAMOL TABLETS 750 MG: 750 TABLET, COATED ORAL at 08:53

## 2024-11-09 RX ADMIN — RIFAXIMIN 400 MG: 200 TABLET ORAL at 20:42

## 2024-11-09 RX ADMIN — DEXAMETHASONE 40 MG: 4 TABLET ORAL at 08:52

## 2024-11-09 RX ADMIN — GABAPENTIN 100 MG: 100 CAPSULE ORAL at 20:34

## 2024-11-09 RX ADMIN — GABAPENTIN 100 MG: 100 CAPSULE ORAL at 08:52

## 2024-11-09 RX ADMIN — LACTULOSE 20 G: 20 SOLUTION ORAL at 08:54

## 2024-11-09 RX ADMIN — PERFLUTREN 1.5 ML: 6.52 INJECTION, SUSPENSION INTRAVENOUS at 11:13

## 2024-11-09 RX ADMIN — CARVEDILOL 12.5 MG: 12.5 TABLET, FILM COATED ORAL at 17:10

## 2024-11-09 ASSESSMENT — PAIN DESCRIPTION - ONSET
ONSET: ON-GOING

## 2024-11-09 ASSESSMENT — PAIN SCALES - GENERAL
PAINLEVEL_OUTOF10: 6
PAINLEVEL_OUTOF10: 6
PAINLEVEL_OUTOF10: 4
PAINLEVEL_OUTOF10: 6
PAINLEVEL_OUTOF10: 6
PAINLEVEL_OUTOF10: 0
PAINLEVEL_OUTOF10: 6

## 2024-11-09 ASSESSMENT — PAIN DESCRIPTION - FREQUENCY
FREQUENCY: CONTINUOUS

## 2024-11-09 ASSESSMENT — PAIN DESCRIPTION - LOCATION
LOCATION: LEG
LOCATION: HAND
LOCATION: NECK;SHOULDER;LEG
LOCATION: LEG;BACK;SHOULDER

## 2024-11-09 ASSESSMENT — PAIN DESCRIPTION - ORIENTATION
ORIENTATION: RIGHT
ORIENTATION: RIGHT;LEFT
ORIENTATION: RIGHT
ORIENTATION: RIGHT;LEFT

## 2024-11-09 ASSESSMENT — PAIN - FUNCTIONAL ASSESSMENT
PAIN_FUNCTIONAL_ASSESSMENT: ACTIVITIES ARE NOT PREVENTED

## 2024-11-09 ASSESSMENT — PAIN DESCRIPTION - DESCRIPTORS
DESCRIPTORS: ACHING

## 2024-11-09 ASSESSMENT — PAIN DESCRIPTION - PAIN TYPE
TYPE: CHRONIC PAIN
TYPE: ACUTE PAIN

## 2024-11-09 NOTE — PROGRESS NOTES
Neurosurgery Progress Note    Patient seen and examined on 11/0/24. No acute events overnight.  Neurologically stable on exam. ANC up to 1.5 with WBC increased to 1.7k in response to steroid therapy.  Platelets remain low but holding on any transfusions pending a surgical date.    A/P: 73 yo man with cervical myelopathy but with thrombocytopenia and neutropenia.     -Neuro stable  -Pain control with PO meds  -Muscle relaxants prn  -Hematology following working on pancytopenia  -Hospitalist following  -DVT ppx with SCDs  -Will reassess for C3-6 decompression pending correction of hematologic abnormalities.  Likely consider OR sometime this week.      Colten Prater MD, PhD  [address], [address]  [place], [place], [address] [phone] (c), [phone] (o)

## 2024-11-09 NOTE — PROGRESS NOTES
Neurosurgery Progress Note    Patient seen and examined on 11/08/24. No acute events overnight. Reports persistent bilateral UE weakness and difficulty ambulating.     A/P: 75 yo man with cervical myelopathy but with thrombocytopenia and neutropenia.     -Neuro stable  -Recheck platelets/CBC with diff  -Hematology following working on pancytopenia  -Hospitalist following  -Will reassess for C3-6 decompression pending correction of hematologic abnormalities. Currently trialing steroid therapy.     Colten Prater MD, PhD  [address], [address]  [place], [address] [phone] (c), [phone] (o)

## 2024-11-09 NOTE — PLAN OF CARE
Problem: Pain  Goal: Verbalizes/displays adequate comfort level or baseline comfort level  Outcome: Progressing  Flowsheets (Taken 11/8/2024 1832 by Fei Marques RN)  Verbalizes/displays adequate comfort level or baseline comfort level:   Encourage patient to monitor pain and request assistance   Assess pain using appropriate pain scale   Implement non-pharmacological measures as appropriate and evaluate response   Administer analgesics based on type and severity of pain and evaluate response   Consider cultural and social influences on pain and pain management   Notify Licensed Independent Practitioner if interventions unsuccessful or patient reports new pain   Medicated pt per orders, please see e-Mar. Encourage pt to call if pain is unrelieved. Will continue to monitor.    Problem: Safety - Adult  Goal: Free from fall injury  Outcome: Progressing   Pt remains free from injury during this shift. Pt is up with assist x 1 person, gait belt and walker. Encourage pt to call for all assistance. Call light is in reach, bed locked and in lowest position. Will continue to monitor.

## 2024-11-09 NOTE — PROGRESS NOTES
4 Eyes Skin Assessment     NAME:  Barrera Carbone  YOB: 1950  MEDICAL RECORD NUMBER:  [id number]    The patient is being assessed for  Transfer to New Unit    I agree that at least one RN has performed a thorough Head to Toe Skin Assessment on the patient. ALL assessment sites listed below have been assessed.      Areas assessed by both nurses:    Head, Face, Ears, Shoulders, Back, Chest, Arms, Elbows, Hands, Sacrum. Buttock, Coccyx, Ischium, and Legs. Feet and Heels        Does the Patient have a Wound? No noted wound(s)       Norberto Prevention initiated by RN: No  Wound Care Orders initiated by RN: No    Pressure Injury (Stage 3,4, Unstageable, DTI, NWPT, and Complex wounds) if present, place Wound referral order by RN under : No    New Ostomies, if present place, Ostomy referral order under : No     Nurse 1 eSignature: Electronically signed by Karoline Martinez RN on 11/9/24 at 1:22 AM EST    **SHARE this note so that the co-signing nurse can place an eSignature**    Nurse 2 eSignature: Fei Marques

## 2024-11-09 NOTE — PROGRESS NOTES
Pt A/Ox4; VSS on room air. Pt voiding adequately per BRP. Pt tolerating ambulation per x1 GB/walker. Pt tolerating PO diet and fluids. Pt pain being managed per MAR with scheduled and PRN medications. All safety precautions are in place; plan of care to continue.

## 2024-11-09 NOTE — PLAN OF CARE
Problem: Chronic Conditions and Co-morbidities  Goal: Patient's chronic conditions and co-morbidity symptoms are monitored and maintained or improved  Outcome: Progressing     Problem: Discharge Planning  Goal: Discharge to home or other facility with appropriate resources  Outcome: Progressing     Problem: Pain  Goal: Verbalizes/displays adequate comfort level or baseline comfort level  11/9/2024 1214 by Nata Oswald RN  Outcome: Progressing   Pt pain being managed per MAR with PRN and scheduled medications; along with non pharm interventions such as intermittent ice packs.     Problem: Safety - Adult  Goal: Free from fall injury  11/9/2024 1214 by Nata Oswald, RN  Outcome: Progressing   All safety precautions are in place; bed in lowest position, wheels locked, bed/chair alarm on, call light and personal belongings within reach.     Problem: ABCDS Injury Assessment  Goal: Absence of physical injury  Outcome: Progressing

## 2024-11-09 NOTE — PROGRESS NOTES
Neurosurgery Progress Note    Patient seen and examined on 11/07/24. No acute events overnight. Reports persistent bilateral UE weakness and difficulty ambulating.     A/P: 75 yo man with cervical myelopathy but with thrombocytopenia and neutropenia.     -Neuro stable  -Recheck platelets/CBC with diff  -Hematology following working on pancytopenia  -Hospitalist following  -Will reassess for C3-6 decompression pending correction of hematologic abnormalities        Colten Prater MD, PhD  [address], [address]  [place], [place], [address] [phone] (c), [phone] (o)

## 2024-11-09 NOTE — PROGRESS NOTES
V2.0    [facility] Progress Note      Name:  Barrera Carbone /Age/Sex: 1950  (74 y.o. male)   MRN & CSN:  [id number] & [id number] Encounter Date/Time: 2024 6:25 AM EST   Location:  [facility]- PCP: No primary care provider on file.     Attending:Colten Prater MD       Hospital Day: 5    Assessment and Recommendations       Assessment/Plan:      Pancytopenia:  Reviewed note from oncology greatly appreciate their input  Patient has a allergy to interferon so therefore cannot start him on G-CSF  Patient was started on dexamethasone 40 mg daily      Thrombocytopenia:  As noted by oncology/hematology likely secondary to peripheral sequestration in addition by splenomegaly versus ITP.  Appears that at least 2 lines of hematological parameters are abnormal and the possibility of underlying MDS/MPD cannot be completely ruled out  The diagnosis would require bone marrow aspiration and biopsy which could be done outpatient  Given the fact patient is in need of surgery patient is being started on dexamethasone 40 mg daily for 4 days and monitor platelet count  Patient will need transfusions prior to surgery if his counts are not in the  K region  Little change in numbers continue dexamethasone    Liver cirrhosis secondary to hepatitis C:  Treated in the past  Ultrasound of his abdomen has revealed an enlarged liver  Hepatitis C viral load has been ordered    Cervical spine degenerative disc disease:  Plan for CT T3-6 posterior cervical decompression fusion and fixation per neurosurgery  Patient with marked increased pain yesterday morning   Currently on p.o. medication as needed  Additionally IV Dilaudid as needed ordered will monitor for excessive sedation and or respiratory depression  Patient started on gabapentin 100 mg 3 times daily  Anticipate going to surgery next week  Goal is resolved neutropenia and have platelets greater than 100,000  PT/OT    Proximal atrial fibrillation:  EKG this morning  shows A-fib  Patient has not been on chronic anticoagulation because of thrombocytopenia ongoing  Reviewed note from cardiology  Continue Coreg for blood pressure and heart rate control    CHF: Without exacerbation type unknown  Coreg 12.5 mg p.o. twice daily  Cardiology recommends start being Jardiance when appropriate from neurosurgical perspective      COPD: Without exacerbation  Currently not taking any medications     Diabetes mellitus type 2:  Apparently diet controlled at this time     CKD stage IV:  Creatinine 2.[facility]   Stable at this time    Opioid dependence on agonist therapy:  Methadone     Hypertension:  Continue Coreg 12.5 mg twice daily     Diabetes mellitus type 2:  Noted diagnosis of [facility] diet controlled  But since started on steroids sugars are trending up at this time  Patient started low-dose correction and at this time will continue to monitor for hypoglycemia given patient has not been on insulin previously     Coronary disease:  [facility]     History of hepatic encephalopathy:  Rifaximin 400 mg 3 times daily  Patient apparently takes lactulose as well which daughter reports yesterday patient's been started on that  Ammonia level was checked and is 24 currently     BPH:  Unclear as to his medication     Tobacco abuse:  Discussed need for discontinuingCannabinoid abuse:    Disposition: Reviewed with case management patient will probably need placement if we get surgery done    MDM       [x] High (any 2)     A. Problems (any 1)  [x] Acute/Chronic Illness/injury posing threat to life or bodily function:    [] Severe exacerbation of chronic illness:    ---------------------------------------------------------------------  B. Risk of Treatment (any 1)   [] Drugs/treatments that require intensive monitoring for toxicity include:    [] IV ABX requiring serial renal monitoring for nephrotoxicity:     [x] IV Narcotic analgesia for adverse drug reaction  [] IV diuresis

## 2024-11-09 NOTE — PROGRESS NOTES
cooks; when pt cooks - sits on stool)  Ambulation Assistance: Independent (using RW; short household distances)  Transfer Assistance: Independent  Active : Yes  Patient's  Info: but not driving recently  Occupation: Retired  Additional Comments: reports he has been in/out of the hospital multiple times and stayed at the [facility] several months at a time - admitted from home.  Reports he is looking for first floor apartment to rent.    Vision/Hearing  Vision  Vision Exceptions: Wears glasses at all times  Hearing  Hearing Exceptions: Bilateral hearing aid (Not present)      Cognition   Orientation  Overall Orientation Status: Within Functional Limits  Cognition  Overall Cognitive Status: WFL  Cognition Comment: Pt with mild confusion noted during conversation    Objective  Observation/Palpation  Observation: Appears to have decreased coordination and motor control of hands    Bed mobility  Supine to Sit: Stand by assistance  Scooting: Stand by assistance  Bed Mobility Comments: Using rail    Transfers  Sit to Stand: Contact guard assistance  Stand to Sit: Contact guard assistance  Comment: Verbal cues for safety during transfers, as pt tends to set walker aside    Ambulation  Device: Rolling Walker  Assistance: Contact guard assistance  Quality of Gait: Flexed posture with increased UE support through RW and shuffling steps with equal weightbearing through LEs  Gait Deviations: Slow Mariajose;Decreased step length;Decreased step height  Distance: 120 feet    Balance  Sitting - Static: Good  Sitting - Dynamic: Fair;+  Standing - Static: Fair (With walker)  Standing - Dynamic: Fair;- (With walker)    AM-PAC - Mobility  AM-PAC Basic Mobility - Inpatient   How much help is needed turning from your back to your side while in a flat bed without using bedrails?: None  How much help is needed moving from lying on your back to sitting on the side of a flat bed without using bedrails?: A Little  How much help is  needed moving to and from a bed to a chair?: A Little  How much help is needed standing up from a chair using your arms?: A Little  How much help is needed walking in hospital room?: A Little  How much help is needed climbing 3-5 steps with a railing?: A Lot  AM-PAC Inpatient Mobility Raw Score : 18  AM-PAC Inpatient T-Scale Score : 43.63  Mobility Inpatient CMS 0-100% Score: 46.58  Mobility Inpatient CMS G-Code Modifier : CK    Goals  Short Term Goals  Time Frame for Short Term Goals: by dc  Short Term Goal 1: pt to perform bed mobility mod I  ONGOING  Short Term Goal 2: pt to perform transfers mod I with LRAD  ONGOING  Short Term Goal 3: pt to amb 50' supervision with LRAD  ONGOING  Short Term Goal 4: pt to negotiate 7 steps with handrails and supervision  ONGOING  Patient Goals   Patient Goals : to go home       Education  Patient Education  Education Given To: Patient  Education Provided: Role of Therapy;Plan of Care;Transfer Training  Education Outcome: Verbalized understanding;Continued education needed    Therapy Time   Individual Concurrent Group Co-treatment   Time In 1434         Time Out 1515         Minutes 41             Timed Code Treatment Minutes:   41    Total Treatment Minutes:   41    Simi Meeks, PT

## 2024-11-09 NOTE — PROGRESS NOTES
Pt is alert and oriented. VSS. Pt ambulated with assistance x 1 person. Tolerated well. Pt c/o pain 7/10, dilaudid given. All safety measures in place. Will continue to monitor.

## 2024-11-09 NOTE — CONSULTS
Lungs:   Clear to auscultation bilaterally, respirations unlabored   Chest Wall:  No tenderness or deformity   Heart:  Regular rate and rhythm, S1, S2 normal, no murmur, rub or gallop PMI intact   Abdomen:   Soft, non-tender, bowel sounds active all four quadrants,  no masses, no organomegaly       Extremities: Extremities normal, atraumatic, no cyanosis or edema   Pulses: 2+ and symmetric   Skin: Skin color, texture, turgor normal, no rashes or lesions   Pysch: Normal mood and affect   Neurologic: Normal gross motor and sensory exam.  Cranial nerves intact        Labs:     Recent Labs     11/06/24  1046 11/07/24  1023 11/08/24  0415 11/09/24  0627    140 134* 135*   K 4.5 5.0 4.6 5.0   BUN 29* 34* 37* 45*   CREATININE 1.9* 2.2* 2.0* 2.0*    108 106 107   CO2 22 25 18* 18*   GLUCOSE 81 90 230* 170*   CALCIUM 9.0 9.4 9.2 9.4     Recent Labs     11/06/24  1046 11/07/24  1023 11/08/24 0415 11/09/24  0627   WBC 1.0* 1.0* 1.0* 1.7*   HGB 10.6* 11.6* 11.3* 11.0*   HCT 33.2* 36.7* 36.1* 34.3*   PLT 57* 57* 57* 59*   MCV 85.2 86.6 87.1 86.0     No results for input(s): \"CHOLTOT\", \"TRIG\", \"HDL\", \"CHOLHDL\", \"LDL\" in the last 72 hours.    Invalid input(s): \"LIPIDCOMM\", \"VLDCHOL\"  No results for input(s): \"INR\" in the last 72 hours.    Invalid input(s): \"PT\", \"PTT\"  No results for input(s): \"CKTOTAL\", \"CKMB\", \"CKMBINDEX\", \"TROPONINI\" in the last 72 hours.  No results for input(s): \"BNP\" in the last 72 hours.  No results for input(s): \"TSH\" in the last 72 hours.  No results for input(s): \"CHOL\", \"HDL\", \"TRIG\" in the last 72 hours.    Invalid input(s): \"LDLCALC\"]    No results found for: \"CKTOTAL\", \"CKMB\", \"CKMBINDEX\", \"TROPONINI\"    No results found for: \"TROPHS\"    Imaging:     I personally reviewed imaging studies including CXR, Stress test, TTE/IRIS.    Last ECG (if available) -personally interpreted EKG:  I have reviewed EKG with the following interpretation  A-fib    Telemetry: Personally interpreted  A-fib,  for the most part rate controlled    Last Stress (if available):    Last TTE/IRIS(if available):    Last Cath (if available):    Last CMR  (if available):      Assessment / Plan:     A-fib/pancytopenia/cervical myelopathy/new onset systolic cardiomyopathy  -Rate controlled, off anticoagulation given pancytopenia  -Continue carvedilol for BP/heart rate control.  If blood pressure becomes soft can consider metoprolol  -Will not attempt aggressive rhythm control strategies given that the patient is not able to remain on anticoagulation given thrombocytopenia  -Asymptomatic from a heart failure perspective, not volume overloaded.  -Echo today shows moderately reduced LV systolic function with severe dilation of the left atrium.  Eventually would like to complete ischemic workup although given thrombocytopenia and need for surgery is an issue.  At this moment the patient is asymptomatic and does not complain of any symptoms suggestive of angina.  Will defer ischemic workup.  -Continue beta-blocker for A-fib/new onset systolic heart failure.  -Would like to start Jardiance when considered appropriate from a surgical perspective.  -Start low-dose hydralazine for afterload reduction/blood pressure management (unable to add Entresto given elevated creatinine)  -From a cardiac perspective okay to proceed with surgery when stable from a hematology perspective.    Tobacco use was discussed with the patient and educated on the negative effects. I have asked the patient to not utilize these agents.    Thank you for allowing to us to participate in the care or Barrera Carbone. Further evaluation will be based upon the patient's clinical course and testing results.    I have spent 55 minutes of face to face time with the patient with more than 50% spent counseling and coordinating care.     All questions and concerns were addressed to the patient/family. Alternatives to my treatment were discussed. The note was completed using EMR.

## 2024-11-10 PROBLEM — I48.21 PERMANENT ATRIAL FIBRILLATION (HCC): Status: ACTIVE | Noted: 2024-11-10

## 2024-11-10 LAB
ALBUMIN SERPL-MCNC: 3.8 G/DL (ref 3.4–5)
ANION GAP SERPL CALCULATED.3IONS-SCNC: 10 MMOL/L (ref 3–16)
BASOPHILS # BLD: 0 K/UL (ref 0–0.2)
BASOPHILS NFR BLD: 0 %
BUN SERPL-MCNC: 57 MG/DL (ref 7–20)
CALCIUM SERPL-MCNC: 9.4 MG/DL (ref 8.3–10.6)
CHLORIDE SERPL-SCNC: 107 MMOL/L (ref 99–110)
CO2 SERPL-SCNC: 17 MMOL/L (ref 21–32)
CREAT SERPL-MCNC: 2.3 MG/DL (ref 0.8–1.3)
DEPRECATED RDW RBC AUTO: 17.1 % (ref 12.4–15.4)
EOSINOPHIL # BLD: 0 K/UL (ref 0–0.6)
EOSINOPHIL NFR BLD: 0 %
GFR SERPLBLD CREATININE-BSD FMLA CKD-EPI: 29 ML/MIN/{1.73_M2}
GLUCOSE BLD-MCNC: 102 MG/DL (ref 70–99)
GLUCOSE BLD-MCNC: 105 MG/DL (ref 70–99)
GLUCOSE BLD-MCNC: 95 MG/DL (ref 70–99)
GLUCOSE BLD-MCNC: 96 MG/DL (ref 70–99)
GLUCOSE BLD-MCNC: 99 MG/DL (ref 70–99)
GLUCOSE SERPL-MCNC: 115 MG/DL (ref 70–99)
HCT VFR BLD AUTO: 36.2 % (ref 40.5–52.5)
HGB BLD-MCNC: 11.2 G/DL (ref 13.5–17.5)
LYMPHOCYTES # BLD: 0.1 K/UL (ref 1–5.1)
LYMPHOCYTES NFR BLD: 5.4 %
MCH RBC QN AUTO: 27.2 PG (ref 26–34)
MCHC RBC AUTO-ENTMCNC: 31 G/DL (ref 31–36)
MCV RBC AUTO: 87.6 FL (ref 80–100)
MONOCYTES # BLD: 0.1 K/UL (ref 0–1.3)
MONOCYTES NFR BLD: 5.5 %
NEUTROPHILS # BLD: 2.4 K/UL (ref 1.7–7.7)
NEUTROPHILS NFR BLD: 89.1 %
PERFORMED ON: ABNORMAL
PERFORMED ON: ABNORMAL
PERFORMED ON: NORMAL
PHOSPHATE SERPL-MCNC: 3.2 MG/DL (ref 2.5–4.9)
PLATELET # BLD AUTO: 55 K/UL (ref 135–450)
PMV BLD AUTO: 9.9 FL (ref 5–10.5)
POTASSIUM SERPL-SCNC: 5.3 MMOL/L (ref 3.5–5.1)
RBC # BLD AUTO: 4.13 M/UL (ref 4.2–5.9)
SODIUM SERPL-SCNC: 134 MMOL/L (ref 136–145)
WBC # BLD AUTO: 2.7 K/UL (ref 4–11)

## 2024-11-10 PROCEDURE — 80069 RENAL FUNCTION PANEL: CPT

## 2024-11-10 PROCEDURE — 6370000000 HC RX 637 (ALT 250 FOR IP): Performed by: NURSE PRACTITIONER

## 2024-11-10 PROCEDURE — 6370000000 HC RX 637 (ALT 250 FOR IP): Performed by: INTERNAL MEDICINE

## 2024-11-10 PROCEDURE — 6360000002 HC RX W HCPCS: Performed by: INTERNAL MEDICINE

## 2024-11-10 PROCEDURE — 2060000000 HC ICU INTERMEDIATE R&B

## 2024-11-10 PROCEDURE — 99232 SBSQ HOSP IP/OBS MODERATE 35: CPT | Performed by: NURSE PRACTITIONER

## 2024-11-10 PROCEDURE — 36415 COLL VENOUS BLD VENIPUNCTURE: CPT

## 2024-11-10 PROCEDURE — 2580000003 HC RX 258: Performed by: ANESTHESIOLOGY

## 2024-11-10 PROCEDURE — 6360000002 HC RX W HCPCS: Performed by: NURSE PRACTITIONER

## 2024-11-10 PROCEDURE — 99233 SBSQ HOSP IP/OBS HIGH 50: CPT | Performed by: INTERNAL MEDICINE

## 2024-11-10 PROCEDURE — APPNB30 APP NON BILLABLE TIME 0-30 MINS: Performed by: NURSE PRACTITIONER

## 2024-11-10 PROCEDURE — 85025 COMPLETE CBC W/AUTO DIFF WBC: CPT

## 2024-11-10 RX ORDER — CARVEDILOL 25 MG/1
25 TABLET ORAL 2 TIMES DAILY WITH MEALS
Status: DISCONTINUED | OUTPATIENT
Start: 2024-11-10 | End: 2024-11-15 | Stop reason: HOSPADM

## 2024-11-10 RX ADMIN — RIFAXIMIN 400 MG: 200 TABLET ORAL at 20:39

## 2024-11-10 RX ADMIN — GABAPENTIN 100 MG: 100 CAPSULE ORAL at 13:47

## 2024-11-10 RX ADMIN — HYDROMORPHONE HYDROCHLORIDE 0.5 MG: 1 INJECTION, SOLUTION INTRAMUSCULAR; INTRAVENOUS; SUBCUTANEOUS at 20:39

## 2024-11-10 RX ADMIN — CARVEDILOL 12.5 MG: 12.5 TABLET, FILM COATED ORAL at 08:17

## 2024-11-10 RX ADMIN — METHOCARBAMOL TABLETS 750 MG: 750 TABLET, COATED ORAL at 08:17

## 2024-11-10 RX ADMIN — METHADONE HYDROCHLORIDE 40 MG: 10 TABLET ORAL at 18:16

## 2024-11-10 RX ADMIN — RIFAXIMIN 400 MG: 200 TABLET ORAL at 08:17

## 2024-11-10 RX ADMIN — OXYCODONE 10 MG: 5 TABLET ORAL at 18:19

## 2024-11-10 RX ADMIN — SODIUM CHLORIDE, PRESERVATIVE FREE 10 ML: 5 INJECTION INTRAVENOUS at 08:18

## 2024-11-10 RX ADMIN — POLYETHYLENE GLYCOL 3350 17 G: 17 POWDER, FOR SOLUTION ORAL at 08:17

## 2024-11-10 RX ADMIN — METHADONE HYDROCHLORIDE 40 MG: 10 TABLET ORAL at 08:16

## 2024-11-10 RX ADMIN — SODIUM CHLORIDE, PRESERVATIVE FREE 10 ML: 5 INJECTION INTRAVENOUS at 20:46

## 2024-11-10 RX ADMIN — DEXAMETHASONE 40 MG: 4 TABLET ORAL at 08:17

## 2024-11-10 RX ADMIN — RIFAXIMIN 400 MG: 200 TABLET ORAL at 13:55

## 2024-11-10 RX ADMIN — GABAPENTIN 100 MG: 100 CAPSULE ORAL at 08:17

## 2024-11-10 RX ADMIN — SENNOSIDES AND DOCUSATE SODIUM 1 TABLET: 50; 8.6 TABLET ORAL at 20:38

## 2024-11-10 RX ADMIN — PANTOPRAZOLE SODIUM 40 MG: 40 TABLET, DELAYED RELEASE ORAL at 16:35

## 2024-11-10 RX ADMIN — SENNOSIDES AND DOCUSATE SODIUM 1 TABLET: 50; 8.6 TABLET ORAL at 08:17

## 2024-11-10 RX ADMIN — DIAZEPAM 5 MG: 5 TABLET ORAL at 09:52

## 2024-11-10 RX ADMIN — METHOCARBAMOL TABLETS 750 MG: 750 TABLET, COATED ORAL at 20:38

## 2024-11-10 RX ADMIN — GABAPENTIN 100 MG: 100 CAPSULE ORAL at 20:40

## 2024-11-10 RX ADMIN — LACTULOSE 20 G: 20 SOLUTION ORAL at 08:17

## 2024-11-10 RX ADMIN — CARVEDILOL 25 MG: 25 TABLET, FILM COATED ORAL at 16:35

## 2024-11-10 RX ADMIN — OXYCODONE 10 MG: 5 TABLET ORAL at 08:19

## 2024-11-10 RX ADMIN — METHOCARBAMOL TABLETS 750 MG: 750 TABLET, COATED ORAL at 13:47

## 2024-11-10 ASSESSMENT — PAIN DESCRIPTION - DESCRIPTORS
DESCRIPTORS: ACHING

## 2024-11-10 ASSESSMENT — PAIN DESCRIPTION - LOCATION
LOCATION: NECK;HAND;BACK
LOCATION: ARM;NECK

## 2024-11-10 ASSESSMENT — PAIN DESCRIPTION - ORIENTATION
ORIENTATION: RIGHT;LEFT
ORIENTATION: POSTERIOR

## 2024-11-10 ASSESSMENT — PAIN SCALES - GENERAL
PAINLEVEL_OUTOF10: 6
PAINLEVEL_OUTOF10: 7
PAINLEVEL_OUTOF10: 6
PAINLEVEL_OUTOF10: 4
PAINLEVEL_OUTOF10: 1
PAINLEVEL_OUTOF10: 4

## 2024-11-10 ASSESSMENT — PAIN DESCRIPTION - PAIN TYPE
TYPE: CHRONIC PAIN

## 2024-11-10 ASSESSMENT — PAIN - FUNCTIONAL ASSESSMENT
PAIN_FUNCTIONAL_ASSESSMENT: ACTIVITIES ARE NOT PREVENTED

## 2024-11-10 ASSESSMENT — PAIN DESCRIPTION - ONSET
ONSET: ON-GOING

## 2024-11-10 ASSESSMENT — PAIN DESCRIPTION - FREQUENCY
FREQUENCY: CONTINUOUS

## 2024-11-10 NOTE — PLAN OF CARE
Problem: Pain  Goal: Verbalizes/displays adequate comfort level or baseline comfort level  11/9/2024 2317 by Karoline Martinez, RN  Outcome: Progressing   Medicated pt per orders, please see e-Mar. Encourage pt to call if pain increases or is unrelieved. Will continue to monitor.    Problem: Safety - Adult  Goal: Free from fall injury  11/9/2024 2317 by Karoline Martinez, RN  Outcome: Progressing   Pt remains free from injury during this shift. Pt is up with assist x 1, gait belt and walker. Encourage pt to call for all assistance. Call light is in reach, bed alarm is activated for safety, bed locked and in lowest position. Will continue to monitor.

## 2024-11-10 NOTE — PROGRESS NOTES
Pt A/Ox4; VSS on room air. Pt pain being managed per MAR with PRN and scheduled medications. Pt tolerating ambulation per walker/GB. Pt voiding per BRP. All safety precautions are in place; plan of care to continue.

## 2024-11-10 NOTE — PLAN OF CARE
Problem: Chronic Conditions and Co-morbidities  Goal: Patient's chronic conditions and co-morbidity symptoms are monitored and maintained or improved  Outcome: Progressing     Problem: Discharge Planning  Goal: Discharge to home or other facility with appropriate resources  Outcome: Progressing     Problem: Pain  Goal: Verbalizes/displays adequate comfort level or baseline comfort level  11/10/2024 0744 by Nata Oswald, RN  Outcome: Progressing   Pt pain being managed per MAR with PRN and scheduled medications; along with non pharm interventions such as intermittent ice packs.     Problem: Safety - Adult  Goal: Free from fall injury  11/10/2024 0744 by Nata Oswald, RN  Outcome: Progressing   All safety precautions are in place; bed in lowest position, wheels locked, bed/chair alarm on, call light and personal belongings within reach.

## 2024-11-10 NOTE — PROGRESS NOTES
[facility]   Cardiology Progress Note     Admit Date: 2024     Reason for follow up: A-fib, nonischemic cardiomyopathy    HPI and Interval History:   Patient seen and examined. Clinical notes reviewed.   Telemetry reviewed. No new complaint today.   No major events overnight.   Denies having chest pain, shortness of breath, dyspnea on exertion, Orthopnea, PND at the time of this visit.    Review of System:  All other systems reviewed except for that noted above. Pertinent negatives and positives are:     General: negative for fever, chills   Ophthalmic ROS: negative for - eye pain or loss of vision  ENT ROS: negative for - headaches, sore throat   Respiratory: negative for - cough, sputum  Cardiovascular: Reviewed in HPI  Gastrointestinal: negative for - abdominal pain, diarrhea, N/V  Hematology: negative for - bleeding, blood clots, bruising or jaundice  Genito-Urinary:  negative for - Dysuria or incontinence  Musculoskeletal: negative for - Joint swelling, muscle pain  Neurological: negative for - confusion, dizziness, headaches   Psychiatric: No anxiety, no depression.  Dermatological: negative for - rash      Physical Examination:  Vitals:    11/10/24 0816   BP: (!) 151/79   Pulse: 91   Resp: 18   Temp: 97.9 °F (36.6 °C)   SpO2: 96%        Intake/Output Summary (Last 24 hours) at 11/10/2024 1143  Last data filed at 2024  Gross per 24 hour   Intake 10 ml   Output --   Net 10 ml     In: 250 [P.O.:240; I.V.:10]  Out: -    Wt Readings from Last 3 Encounters:   24 84.8 kg (187 lb)   24 91.7 kg (202 lb 2.6 oz)     Temp  Av.7 °F (36.5 °C)  Min: 97.4 °F (36.3 °C)  Max: 98 °F (36.7 °C)  Pulse  Av.3  Min: 88  Max: 92  BP  Min: 115/72  Max: 155/82  SpO2  Av.6 %  Min: 94 %  Max: 97 %    Telemetry: A-fib  Constitutional: Alert. Oriented to person, place, and time. No distress.   Head: Normocephalic and atraumatic.   Mouth/Throat: Lips appear moist. Oropharynx is clear and

## 2024-11-10 NOTE — PROGRESS NOTES
V2.0    [facility] [place]      Name:  Barrera Carbone /Age/Sex: 1950  (74 y.o. male)   MRN & CSN:  [id number] & [id number] Encounter Date/Time: 11/10/2024 6:25 AM EST   Location:  [facility]- PCP: No primary care provider on file.     Attending:Colten Prater MD       Hospital Day: 6    Assessment and Recommendations       Assessment/Plan:      Pancytopenia:  Reviewed note from oncology greatly appreciate their input  Patient has a allergy to interferon so therefore cannot start him on G-CSF  Patient was started on dexamethasone 40 mg daily  White blood cell count up to 2.7    Thrombocytopenia:  As noted by oncology/hematology likely secondary to peripheral sequestration in addition by splenomegaly versus ITP.  Appears that at least 2 lines of hematological parameters are abnormal and the possibility of underlying MDS/MPD cannot be completely ruled out  The diagnosis would require bone marrow aspiration and biopsy which could be done outpatient  Given the fact patient is in need of surgery patient is being started on dexamethasone 40 mg daily for 4 days and monitor platelet count  Patient will need transfusions prior to surgery if his counts are not in the  K region  Little change in numbers at this time    Liver cirrhosis secondary to hepatitis C:  Treated in the past  Ultrasound of his abdomen has revealed an enlarged liver  Hepatitis C viral load has been ordered    Cervical spine degenerative disc disease:  Plan for CT T3-6 posterior cervical decompression fusion and fixation per neurosurgery  Patient with marked increased pain yesterday morning   Currently on p.o. medication as needed  Patient started on gabapentin 100 mg 3 times daily  Anticipate going to surgery next week  Goal is resolved neutropenia and have platelets greater than 100,000  PT/OT  Additionally IV Dilaudid as needed and will monitor for excessive sedation and or respiratory depression  His pain is

## 2024-11-10 NOTE — PROGRESS NOTES
Neurosurgery Progress Note    Patient seen and examined on 11/0/24. No acute events overnight.  Neurologically stable on exam. ANC up to 2.4 with WBC increased to 2.7k in response to steroid therapy.  Platelets remain low but holding on any transfusions pending a surgical date.    A/P: 73 yo man with cervical myelopathy but with thrombocytopenia and neutropenia.     -Neuro stable  -Pain control with PO meds  -Muscle relaxants prn  -Hematology following working on pancytopenia  -Hospitalist following  -DVT ppx with SCDs  -Will reassess for C3-6 decompression pending correction of hematologic abnormalities.  Likely consider OR sometime this week.      Ahmet Barnett, APRN - CNP   Discussed pt with Dr. Prater and he agrees with plan.

## 2024-11-11 PROBLEM — I77.810 DILATED AORTIC ROOT (HCC): Status: ACTIVE | Noted: 2024-11-11

## 2024-11-11 PROBLEM — I50.20 HFREF (HEART FAILURE WITH REDUCED EJECTION FRACTION) (HCC): Status: ACTIVE | Noted: 2024-11-11

## 2024-11-11 PROBLEM — I77.810 ASCENDING AORTA DILATATION (HCC): Status: ACTIVE | Noted: 2024-11-11

## 2024-11-11 PROBLEM — I50.21 ACUTE SYSTOLIC CHF (CONGESTIVE HEART FAILURE) (HCC): Status: ACTIVE | Noted: 2024-11-11

## 2024-11-11 PROBLEM — I10 PRIMARY HYPERTENSION: Status: ACTIVE | Noted: 2024-11-11

## 2024-11-11 PROBLEM — I42.8 NICM (NONISCHEMIC CARDIOMYOPATHY) (HCC): Status: ACTIVE | Noted: 2024-11-11

## 2024-11-11 LAB
ALBUMIN SERPL-MCNC: 3.8 G/DL (ref 3.4–5)
ANION GAP SERPL CALCULATED.3IONS-SCNC: 7 MMOL/L (ref 3–16)
BASE EXCESS BLDA CALC-SCNC: -6.9 MMOL/L (ref -3–3)
BUN SERPL-MCNC: 68 MG/DL (ref 7–20)
CALCIUM SERPL-MCNC: 9.4 MG/DL (ref 8.3–10.6)
CHLORIDE SERPL-SCNC: 109 MMOL/L (ref 99–110)
CO2 BLDA-SCNC: 22 MMOL/L
CO2 SERPL-SCNC: 22 MMOL/L (ref 21–32)
COHGB MFR BLDA: 1 % (ref 0–1.5)
CREAT SERPL-MCNC: 2.4 MG/DL (ref 0.8–1.3)
DEPRECATED RDW RBC AUTO: 17.1 % (ref 12.4–15.4)
GFR SERPLBLD CREATININE-BSD FMLA CKD-EPI: 28 ML/MIN/{1.73_M2}
GLUCOSE BLD-MCNC: 142 MG/DL (ref 70–99)
GLUCOSE BLD-MCNC: 80 MG/DL (ref 70–99)
GLUCOSE BLD-MCNC: 93 MG/DL (ref 70–99)
GLUCOSE BLD-MCNC: 97 MG/DL (ref 70–99)
GLUCOSE SERPL-MCNC: 101 MG/DL (ref 70–99)
HCO3 BLDA-SCNC: 20 MMOL/L (ref 21–29)
HCT VFR BLD AUTO: 35.1 % (ref 40.5–52.5)
HCV RNA SERPL NAA+PROBE-ACNC: NOT DETECTED IU/ML
HCV RNA SERPL NAA+PROBE-LOG IU: NOT DETECTED LOG IU/ML
HCV RNA SERPL QL NAA+PROBE: NOT DETECTED
HGB BLD-MCNC: 10.9 G/DL (ref 13.5–17.5)
HGB BLDA-MCNC: 10.9 G/DL
MCH RBC QN AUTO: 27.2 PG (ref 26–34)
MCHC RBC AUTO-ENTMCNC: 31.1 G/DL (ref 31–36)
MCV RBC AUTO: 87.3 FL (ref 80–100)
METHGB MFR BLDA: 0.6 % (ref 0–1.4)
NT-PROBNP SERPL-MCNC: 3154 PG/ML (ref 0–124)
PCO2 BLDA: 46.3 MMHG (ref 35–45)
PERFORMED ON: ABNORMAL
PERFORMED ON: NORMAL
PH BLDA: 7.25 [PH] (ref 7.35–7.45)
PHOSPHATE SERPL-MCNC: 3.5 MG/DL (ref 2.5–4.9)
PLATELET # BLD AUTO: 52 K/UL (ref 135–450)
PMV BLD AUTO: 9.9 FL (ref 5–10.5)
PO2 BLDA: 78.6 MMHG (ref 75–108)
POTASSIUM SERPL-SCNC: 5.1 MMOL/L (ref 3.5–5.1)
RBC # BLD AUTO: 4.02 M/UL (ref 4.2–5.9)
SAO2 % BLDA: 95 % (ref 93–100)
SODIUM SERPL-SCNC: 138 MMOL/L (ref 136–145)
WBC # BLD AUTO: 1.6 K/UL (ref 4–11)

## 2024-11-11 PROCEDURE — 6360000002 HC RX W HCPCS: Performed by: INTERNAL MEDICINE

## 2024-11-11 PROCEDURE — 6360000002 HC RX W HCPCS: Performed by: NURSE PRACTITIONER

## 2024-11-11 PROCEDURE — 99231 SBSQ HOSP IP/OBS SF/LOW 25: CPT | Performed by: NURSE PRACTITIONER

## 2024-11-11 PROCEDURE — 80069 RENAL FUNCTION PANEL: CPT

## 2024-11-11 PROCEDURE — 6370000000 HC RX 637 (ALT 250 FOR IP): Performed by: INTERNAL MEDICINE

## 2024-11-11 PROCEDURE — 82803 BLOOD GASES ANY COMBINATION: CPT

## 2024-11-11 PROCEDURE — 6370000000 HC RX 637 (ALT 250 FOR IP): Performed by: NURSE PRACTITIONER

## 2024-11-11 PROCEDURE — 2060000000 HC ICU INTERMEDIATE R&B

## 2024-11-11 PROCEDURE — 36415 COLL VENOUS BLD VENIPUNCTURE: CPT

## 2024-11-11 PROCEDURE — 36600 WITHDRAWAL OF ARTERIAL BLOOD: CPT

## 2024-11-11 PROCEDURE — 2580000003 HC RX 258: Performed by: ANESTHESIOLOGY

## 2024-11-11 PROCEDURE — 83880 ASSAY OF NATRIURETIC PEPTIDE: CPT

## 2024-11-11 PROCEDURE — 85027 COMPLETE CBC AUTOMATED: CPT

## 2024-11-11 RX ADMIN — SODIUM CHLORIDE, PRESERVATIVE FREE 10 ML: 5 INJECTION INTRAVENOUS at 09:16

## 2024-11-11 RX ADMIN — ONDANSETRON 4 MG: 2 INJECTION INTRAMUSCULAR; INTRAVENOUS at 20:10

## 2024-11-11 RX ADMIN — METHOCARBAMOL TABLETS 750 MG: 750 TABLET, COATED ORAL at 20:10

## 2024-11-11 RX ADMIN — GABAPENTIN 100 MG: 100 CAPSULE ORAL at 14:02

## 2024-11-11 RX ADMIN — LACTULOSE 20 G: 20 SOLUTION ORAL at 09:15

## 2024-11-11 RX ADMIN — PANTOPRAZOLE SODIUM 40 MG: 40 TABLET, DELAYED RELEASE ORAL at 16:44

## 2024-11-11 RX ADMIN — GABAPENTIN 100 MG: 100 CAPSULE ORAL at 20:10

## 2024-11-11 RX ADMIN — METHOCARBAMOL TABLETS 750 MG: 750 TABLET, COATED ORAL at 10:27

## 2024-11-11 RX ADMIN — METHOCARBAMOL TABLETS 750 MG: 750 TABLET, COATED ORAL at 14:02

## 2024-11-11 RX ADMIN — METHADONE HYDROCHLORIDE 40 MG: 10 TABLET ORAL at 17:50

## 2024-11-11 RX ADMIN — SENNOSIDES AND DOCUSATE SODIUM 1 TABLET: 50; 8.6 TABLET ORAL at 09:15

## 2024-11-11 RX ADMIN — OXYCODONE 10 MG: 5 TABLET ORAL at 17:50

## 2024-11-11 RX ADMIN — HYDROMORPHONE HYDROCHLORIDE 0.5 MG: 1 INJECTION, SOLUTION INTRAMUSCULAR; INTRAVENOUS; SUBCUTANEOUS at 04:52

## 2024-11-11 RX ADMIN — DIAZEPAM 5 MG: 5 TABLET ORAL at 12:04

## 2024-11-11 RX ADMIN — METHADONE HYDROCHLORIDE 40 MG: 10 TABLET ORAL at 04:56

## 2024-11-11 RX ADMIN — METHOCARBAMOL TABLETS 750 MG: 750 TABLET, COATED ORAL at 03:16

## 2024-11-11 RX ADMIN — OXYCODONE 10 MG: 5 TABLET ORAL at 12:04

## 2024-11-11 RX ADMIN — GABAPENTIN 100 MG: 100 CAPSULE ORAL at 09:15

## 2024-11-11 RX ADMIN — SENNOSIDES AND DOCUSATE SODIUM 1 TABLET: 50; 8.6 TABLET ORAL at 19:57

## 2024-11-11 RX ADMIN — CARVEDILOL 25 MG: 25 TABLET, FILM COATED ORAL at 16:44

## 2024-11-11 RX ADMIN — POLYETHYLENE GLYCOL 3350 17 G: 17 POWDER, FOR SOLUTION ORAL at 09:17

## 2024-11-11 RX ADMIN — RIFAXIMIN 400 MG: 200 TABLET ORAL at 09:18

## 2024-11-11 RX ADMIN — OXYCODONE 10 MG: 5 TABLET ORAL at 23:29

## 2024-11-11 RX ADMIN — CARVEDILOL 25 MG: 25 TABLET, FILM COATED ORAL at 09:14

## 2024-11-11 RX ADMIN — LACTULOSE 20 G: 20 SOLUTION ORAL at 19:58

## 2024-11-11 RX ADMIN — RIFAXIMIN 400 MG: 200 TABLET ORAL at 19:57

## 2024-11-11 RX ADMIN — RIFAXIMIN 400 MG: 200 TABLET ORAL at 14:03

## 2024-11-11 ASSESSMENT — PAIN SCALES - GENERAL
PAINLEVEL_OUTOF10: 8
PAINLEVEL_OUTOF10: 5
PAINLEVEL_OUTOF10: 4
PAINLEVEL_OUTOF10: 6
PAINLEVEL_OUTOF10: 8
PAINLEVEL_OUTOF10: 2
PAINLEVEL_OUTOF10: 2
PAINLEVEL_OUTOF10: 9
PAINLEVEL_OUTOF10: 2

## 2024-11-11 ASSESSMENT — PAIN DESCRIPTION - LOCATION
LOCATION: NECK;LEG
LOCATION: NECK

## 2024-11-11 ASSESSMENT — PAIN DESCRIPTION - PAIN TYPE
TYPE: CHRONIC PAIN

## 2024-11-11 ASSESSMENT — PAIN DESCRIPTION - ONSET
ONSET: ON-GOING

## 2024-11-11 ASSESSMENT — PAIN DESCRIPTION - DESCRIPTORS
DESCRIPTORS: ACHING;SHOOTING
DESCRIPTORS: ACHING
DESCRIPTORS: ACHING;SHOOTING
DESCRIPTORS: ACHING;THROBBING

## 2024-11-11 ASSESSMENT — PAIN - FUNCTIONAL ASSESSMENT
PAIN_FUNCTIONAL_ASSESSMENT: ACTIVITIES ARE NOT PREVENTED

## 2024-11-11 ASSESSMENT — PAIN DESCRIPTION - FREQUENCY
FREQUENCY: CONTINUOUS

## 2024-11-11 ASSESSMENT — PAIN DESCRIPTION - ORIENTATION
ORIENTATION: POSTERIOR
ORIENTATION: POSTERIOR
ORIENTATION: RIGHT
ORIENTATION: MID

## 2024-11-11 NOTE — CARE COORDINATION
Patient awaiting surgery, likely on Wednesday.  CM spoke with patient at bedside.  Patient seemed frustrated about beds alarm and having to wait for surgery.  CM explained roll for d/c planning, offered any assistance at discharge.  Patient declined any needs at this time.    Alicia Corley RN, BSN,    Ortho/Neuro   [phone]

## 2024-11-11 NOTE — PLAN OF CARE
Problem: Discharge Planning  Goal: Discharge to home or other facility with appropriate resources  11/11/2024 1031 by Glo Staples RN  Outcome: Progressing  Flowsheets (Taken 11/11/2024 0900)  Discharge to home or other facility with appropriate resources: Identify barriers to discharge with patient and caregiver  Pt involved in discharge planning. Barriers to discharge discussed with patient. Discharge learning needs identified. Discuss with patient any additional needed resources and transportation plans. Case management following plan of care.      Problem: Pain  Goal: Verbalizes/displays adequate comfort level or baseline comfort level  11/11/2024 1031 by Glo Staples, RN  Outcome: Progressing  Pt endorsing pain to back and neck. Being treated with PRN pain medication, rest, and frequent repositioning with pillow support for comfort and pressure relief. Pt reports some relief from pain with above interventions.      Problem: Safety - Adult  Goal: Free from fall injury  11/11/2024 1031 by Glo Staples, RN  Outcome: Progressing   All fall precautions in place. Bed locked and in lowest position with alarm on. Overbed table and personal belonings within reach. Call light within reach and patient instructed to use call light for assistance. Non-skid socks on.

## 2024-11-11 NOTE — PROGRESS NOTES
Pt awake in bed sitting at bedside, alert and oriented x4. VSS on RA, pt exhibiting no s/s acute distress at this time. Pt a fib on tele. Pt tolerating PO diet well. Pt ambulated this shift x1 GB walker tolerating well. Pt voiding via BRP. Pt pain managed via MAR. Standard safety precautions in place, bed locked and in lowest position, bed/chair alarm on, gripper socks applied, bedside table/call light within reach, pt oriented to fall prevention measures. Pt states he Has no other needs at this time. Plan of care to continue.    ,Electronically signed by AKI PANDYA RN on 11/11/2024 at 12:04 AM

## 2024-11-11 NOTE — PROGRESS NOTES
[facility] Cardiology Progress Note    Primary Cardiologist: Dr Matta    CC/HPI: AF, NICM    S:  C/o JEAN. Denies cp or palpitations.     Tele: AF HR 88    O:  Physical Exam:  /74   Pulse 84   Temp 98.1 °F (36.7 °C) (Oral)   Resp 16   Ht 1.854 m (6' 1\")   Wt 84.8 kg (187 lb)   SpO2 97%   BMI 24.67 kg/m²    General (appearance):  No acute distress  Eyes: anicteric   Neck: soft, No JVD  Ears/Nose/Mouth/Thorat: No cyanosis  CV: Irreg, irreg   Respiratory:  clear, normal effort  GI: soft, non-tender, non-distended  Skin: Warm, dry. No rashes  Neuro/Psych: Alert and oriented x 3. Appropriate behavior  Ext:  No c/c.     I.O's= +3.6 L     Weight  Admission: Weight - Scale: 85.3 kg (188 lb)   Today: Weight - Scale: 84.8 kg (187 lb)    CBC:   Recent Labs     24  0627 11/10/24  0719 24  0731   WBC 1.7* 2.7* 1.6*   HGB 11.0* 11.2* 10.9*   HCT 34.3* 36.2* 35.1*   MCV 86.0 87.6 87.3   PLT 59* 55* 52*     BMP:   Recent Labs     24  0627 11/10/24  0719 24  0731   * 134* 138   K 5.0 5.3* 5.1    107 109   CO2 18* 17* 22   PHOS 2.9 3.2 3.5   BUN 45* 57* 68*   CREATININE 2.0* 2.3* 2.4*     Estimated Creatinine Clearance: 31 mL/min (A) (based on SCr of 2.4 mg/dL (H)).  Mag: No results found for: \"MG\"  LIVER PROFILE: No results for input(s): \"AST\", \"ALT\", \"LIPASE\", \"AMYLASE\", \"BILIDIR\", \"BILITOT\", \"ALKPHOS\" in the last 72 hours.    Invalid input(s): \"ALB\"  Pro-BNP: No results found for: \"PROBNP\"  High Sensitivity Troponin:   No results found for: \"CKTOTAL\", \"CKMB\", \"CKMBINDEX\", \"TROPONINI\", \"TROPHS\"    Imagin2024 Echo    Left Ventricle: Moderately reduced left ventricular systolic function with a visually estimated EF of 35 - 40%. Left ventricle is dilated. Normal wall thickness. Global hypokinesis present.    Right Ventricle: Normal systolic function.    Aortic Valve: Trileaflet valve. Mild to moderate regurgitation.    Mitral Valve: Trace

## 2024-11-11 NOTE — PROGRESS NOTES
Neurosurgery Progress Note    Patient seen and examined on 11/08/24. No acute events overnight. Neurologically stable on exam. WBC and ANC increasing.     A/P: 73 yo man with cervical myelopathy but with thrombocytopenia and neutropenia.     -Neuro stable  -Recheck platelets/CBC with diff  -Hematology following working on pancytopenia  -Hospitalist following  -Will reassess for C3-6 decompression pending correction of hematologic abnormalities. Currently trialing steroid therapy. Possibly later this week if cleared.     Colten Prater MD, PhD  [address], [address]  [place], [place], [address] [phone] (c), [phone] (o)

## 2024-11-11 NOTE — PROGRESS NOTES
ONCOLOGY HEMATOLOGY CARE PROGRESS NOTE      SUBJECTIVE:    He is resting quietly at the time of my evaluation. No family at bedside.       OBJECTIVE        Physical    VITALS:  Patient Vitals for the past 24 hrs:   BP Temp Temp src Pulse Resp SpO2   11/11/24 1204 -- -- -- -- 16 --   11/11/24 1154 129/80 98.3 °F (36.8 °C) Oral -- 16 98 %   11/11/24 0900 117/74 98.1 °F (36.7 °C) Oral -- 16 97 %   11/11/24 0526 -- -- -- -- 15 --   11/11/24 0522 -- -- -- -- 15 --   11/11/24 0452 -- -- -- -- 16 --   11/11/24 0318 124/76 97.6 °F (36.4 °C) Oral 84 16 --   11/10/24 2319 124/87 97.3 °F (36.3 °C) Oral 83 16 98 %   11/10/24 2109 -- -- -- -- 16 --   11/10/24 2038 -- -- -- -- 16 --   11/10/24 1915 129/88 97.5 °F (36.4 °C) Oral -- 16 98 %   11/10/24 1615 128/83 98 °F (36.7 °C) Temporal -- 15 94 %       24HR INTAKE/OUTPUT:    Intake/Output Summary (Last 24 hours) at 11/11/2024 1334  Last data filed at 11/11/2024 1045  Gross per 24 hour   Intake 960 ml   Output 200 ml   Net 760 ml       CONSTITUTIONAL:  no apparent distress   ENT: Normocephalic, without obvious abnormality, atraumatic  NECK: supple, symmetrical, no jugular venous distension   LUNGS: no increased work of breathing   CARDIOVASCULAR: regular rate and rhythm on monitor   NEUROLOGIC: asleep   SKIN: Normal skin color, texture, turgor and no jaundice. appears intact   EXTREMITIES: no LE edema     DATA:  CBC:    Recent Labs     11/11/24  0731 11/10/24  0719 11/09/24  0627 11/08/24  0415 11/07/24  1023 11/06/24  1046 11/06/24  0842 11/05/24  1810   WBC 1.6* 2.7* 1.7* 1.0*   < > 1.0*  --  1.0*   NEUTROABS  --  2.4 1.5*  --   --  0.5*  --  0.7*   LYMPHOPCT  --  5.4 8.4  --   --  31.1  --  22.0   RBC 4.02* 4.13* 3.98* 4.14*   < > 3.90*  --  4.14*   HGB 10.9* 11.2* 11.0* 11.3*   < > 10.6*  --  11.3*   HCT 35.1* 36.2* 34.3* 36.1*   < > 33.2*  --  35.9*   MCV 87.3 87.6 86.0 87.1   < > 85.2  --  86.8   MCH 27.2 27.2 27.6 27.3   < > 27.1  --

## 2024-11-11 NOTE — PROGRESS NOTES
Neurosurgery Progress Note    Patient seen and examined on 11/11/24. No acute events overnight.  Neurologically stable on exam. WBC back down to 1.6 today, but platelets remain >50. Will need platelets pre-op and post-op.    A/P: 75 yo man with cervical myelopathy but with thrombocytopenia and neutropenia.     -Neuro stable  -Pain control with PO meds  -Muscle relaxants prn  -Hematology following working on pancytopenia  -Hospitalist following  -DVT ppx with SCDs  -Will reassess for C3-6 decompression pending correction of hematologic abnormalities.  Likely consider OR Wednesday.      Javier Alegria, APRN - CNP   Discussed pt with Dr. Prater and he agrees with plan.

## 2024-11-11 NOTE — PROGRESS NOTES
Electrophysiology - PROGRESS NOTE    Admit Date: 11/5/2024     Chief Complaint: AF/RVR     Interval History:   Patient seen and examined and notes reviewed. Patient is being followed for AF/RVR.  Patient had presented with imbalance and difficulty with fine motor movements.  He has progressive fine motor dysfunction and balance consistent with cervical myelopathy.  He was noted to have atrial fibrillation with RVR on telemetry.  Carvedilol was increased to 25 mg twice a day.  His heart rate has been controlled overnight.  He remains in atrial fibrillation.  He had an echo that showed his EF was 35 to 40%.  We are unable to access [facility] to determine if this is a new cardiomyopathy.  Patient is due to have a cervical decompression and fusion however this has been delayed due to low platelets.  Patient is agitated and confused this morning.    In: 720 [P.O.:720]  Out: 200    Wt Readings from Last 2 Encounters:   11/09/24 84.8 kg (187 lb)   03/12/24 91.7 kg (202 lb 2.6 oz)       Data:   Scheduled Meds:   Scheduled Meds:   carvedilol  25 mg Oral BID WC    lactulose  20 g Oral BID    insulin lispro  0-4 Units SubCUTAneous 4x Daily AC & HS    pantoprazole  40 mg Oral QAM AC    gabapentin  100 mg Oral TID    methocarbamol  750 mg Oral Q6H    sodium chloride flush  5-40 mL IntraVENous 2 times per day    rifAXIMin  400 mg Oral TID    sennosides-docusate sodium  1 tablet Oral BID    polyethylene glycol  17 g Oral Daily    methadone  40 mg Oral Q12H     Continuous Infusions:  PRN Meds:.[DISCONTINUED] oxyCODONE **OR** oxyCODONE, [DISCONTINUED] HYDROmorphone **OR** HYDROmorphone, sodium chloride flush, ondansetron **OR** ondansetron, bisacodyl, diazePAM  Continuous Infusions:    Intake/Output Summary (Last 24 hours) at 11/11/2024 0929  Last data filed at 11/11/2024 0600  Gross per 24 hour   Intake 720 ml   Output 200 ml   Net 520 ml       CBC:   Lab Results

## 2024-11-11 NOTE — PROGRESS NOTES
V2.0    [facility] [place] Progress Note      Name:  Barrera Carbone /Age/Sex: 1950  (74 y.o. male)   MRN & CSN:  [id number] & [id number] Encounter Date/Time: 2024 6:25 AM EST   Location:  [facility]- PCP: No primary care provider on file.     Attending:Colten Prater MD       Hospital Day: 7    Assessment and Recommendations       Assessment/Plan:      Pancytopenia:  Reviewed note from oncology greatly appreciate their input  Patient has a allergy to interferon so therefore cannot start him on G-CSF  Patient was started on dexamethasone 40 mg daily  WBC 1.6    Thrombocytopenia:  As noted by oncology/hematology likely secondary to peripheral sequestration in addition by splenomegaly versus ITP.  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 medication as needed  Patient started on gabapentin 100 mg 3 times daily  Anticipate going to surgery next week  Goal is resolved neutropenia and have platelets greater than 100,000  PT/OT  Additionally IV Dilaudid as needed and will monitor for excessive sedation and or respiratory depression  His pain is controlled at this time  Reviewed note

## 2024-11-11 NOTE — PROGRESS NOTES
Occupational Therapy/Physical Therapy  Chart reviewed.  Pt declining therapy at this time stating he needs to make a phone call to his .  Will follow up later today as schedule permits.  BRYAN Marquez, OTR/L [id number]   Simi Meeks PT [id number]

## 2024-11-11 NOTE — PLAN OF CARE
Problem: Chronic Conditions and Co-morbidities  Goal: Patient's chronic conditions and co-morbidity symptoms are monitored and maintained or improved  Outcome: Progressing     Pt chronic conditions managed via MAR    Problem: Discharge Planning  Goal: Discharge to home or other facility with appropriate resources  Outcome: Progressing     Problem: Pain  Goal: Verbalizes/displays adequate comfort level or baseline comfort level  Outcome: Progressing     Pt pain managed via MAR    Problem: Safety - Adult  Goal: Free from fall injury  Outcome: Progressing     Standard safety precautions in place, bed locked and in lowest position, bed/chair alarm on, bedside table/call light within reach, gripper socks applied, pt oriented to fall prevention measures. Fall prevention measures reinforced to pt as needed.    Problem: ABCDS Injury Assessment  Goal: Absence of physical injury  Outcome: Progressing

## 2024-11-11 NOTE — PROGRESS NOTES
Pt refusing to take part in admission documentation at this time, pt verbally aggressive to RN and asks to not be bothered

## 2024-11-12 ENCOUNTER — ANESTHESIA EVENT (OUTPATIENT)
Dept: OPERATING ROOM | Age: 74
End: 2024-11-12
Payer: OTHER GOVERNMENT

## 2024-11-12 LAB
ABO + RH BLD: NORMAL
ALBUMIN SERPL-MCNC: 3.7 G/DL (ref 3.4–5)
ANION GAP SERPL CALCULATED.3IONS-SCNC: 6 MMOL/L (ref 3–16)
ANISOCYTOSIS BLD QL SMEAR: ABNORMAL
BASOPHILS # BLD: 0 K/UL (ref 0–0.2)
BASOPHILS NFR BLD: 0 %
BLD GP AB SCN SERPL QL: NORMAL
BLOOD BANK DISPENSE STATUS: NORMAL
BLOOD BANK DISPENSE STATUS: NORMAL
BLOOD BANK PRODUCT CODE: NORMAL
BLOOD BANK PRODUCT CODE: NORMAL
BPU ID: NORMAL
BPU ID: NORMAL
BUN SERPL-MCNC: 73 MG/DL (ref 7–20)
CALCIUM SERPL-MCNC: 9.1 MG/DL (ref 8.3–10.6)
CHLORIDE SERPL-SCNC: 110 MMOL/L (ref 99–110)
CO2 SERPL-SCNC: 23 MMOL/L (ref 21–32)
CREAT SERPL-MCNC: 2.9 MG/DL (ref 0.8–1.3)
DACRYOCYTES BLD QL SMEAR: ABNORMAL
DEPRECATED RDW RBC AUTO: 17.6 % (ref 12.4–15.4)
DESCRIPTION BLOOD BANK: NORMAL
DESCRIPTION BLOOD BANK: NORMAL
EOSINOPHIL # BLD: 0 K/UL (ref 0–0.6)
EOSINOPHIL NFR BLD: 0 %
GFR SERPLBLD CREATININE-BSD FMLA CKD-EPI: 22 ML/MIN/{1.73_M2}
GLUCOSE BLD-MCNC: 103 MG/DL (ref 70–99)
GLUCOSE BLD-MCNC: 107 MG/DL (ref 70–99)
GLUCOSE BLD-MCNC: 116 MG/DL (ref 70–99)
GLUCOSE BLD-MCNC: 184 MG/DL (ref 70–99)
GLUCOSE SERPL-MCNC: 95 MG/DL (ref 70–99)
HCT VFR BLD AUTO: 34.8 % (ref 40.5–52.5)
HGB BLD-MCNC: 10.9 G/DL (ref 13.5–17.5)
LYMPHOCYTES # BLD: 0.4 K/UL (ref 1–5.1)
LYMPHOCYTES NFR BLD: 31 %
MCH RBC QN AUTO: 27.4 PG (ref 26–34)
MCHC RBC AUTO-ENTMCNC: 31.3 G/DL (ref 31–36)
MCV RBC AUTO: 87.5 FL (ref 80–100)
MONOCYTES # BLD: 0.1 K/UL (ref 0–1.3)
MONOCYTES NFR BLD: 9 %
NEUTROPHILS # BLD: 0.7 K/UL (ref 1.7–7.7)
NEUTROPHILS NFR BLD: 60 %
OVALOCYTES BLD QL SMEAR: ABNORMAL
PERFORMED ON: ABNORMAL
PHOSPHATE SERPL-MCNC: 4.3 MG/DL (ref 2.5–4.9)
PLATELET # BLD AUTO: 49 K/UL (ref 135–450)
PLATELET BLD QL SMEAR: ABNORMAL
PMV BLD AUTO: 9.3 FL (ref 5–10.5)
POTASSIUM SERPL-SCNC: 5.3 MMOL/L (ref 3.5–5.1)
RBC # BLD AUTO: 3.98 M/UL (ref 4.2–5.9)
SCHISTOCYTES BLD QL SMEAR: ABNORMAL
SODIUM SERPL-SCNC: 139 MMOL/L (ref 136–145)
WBC # BLD AUTO: 1.2 K/UL (ref 4–11)

## 2024-11-12 PROCEDURE — 6370000000 HC RX 637 (ALT 250 FOR IP): Performed by: NURSE PRACTITIONER

## 2024-11-12 PROCEDURE — 6370000000 HC RX 637 (ALT 250 FOR IP): Performed by: INTERNAL MEDICINE

## 2024-11-12 PROCEDURE — 86900 BLOOD TYPING SEROLOGIC ABO: CPT

## 2024-11-12 PROCEDURE — 99231 SBSQ HOSP IP/OBS SF/LOW 25: CPT | Performed by: NURSE PRACTITIONER

## 2024-11-12 PROCEDURE — 85025 COMPLETE CBC W/AUTO DIFF WBC: CPT

## 2024-11-12 PROCEDURE — 80069 RENAL FUNCTION PANEL: CPT

## 2024-11-12 PROCEDURE — 36430 TRANSFUSION BLD/BLD COMPNT: CPT

## 2024-11-12 PROCEDURE — 36415 COLL VENOUS BLD VENIPUNCTURE: CPT

## 2024-11-12 PROCEDURE — 86850 RBC ANTIBODY SCREEN: CPT

## 2024-11-12 PROCEDURE — 2060000000 HC ICU INTERMEDIATE R&B

## 2024-11-12 PROCEDURE — P9035 PLATELET PHERES LEUKOREDUCED: HCPCS

## 2024-11-12 PROCEDURE — 97535 SELF CARE MNGMENT TRAINING: CPT

## 2024-11-12 PROCEDURE — 2580000003 HC RX 258: Performed by: ANESTHESIOLOGY

## 2024-11-12 PROCEDURE — 86901 BLOOD TYPING SEROLOGIC RH(D): CPT

## 2024-11-12 RX ORDER — SODIUM CHLORIDE 9 MG/ML
INJECTION, SOLUTION INTRAVENOUS PRN
Status: DISCONTINUED | OUTPATIENT
Start: 2024-11-12 | End: 2024-11-15

## 2024-11-12 RX ADMIN — LACTULOSE 20 G: 20 SOLUTION ORAL at 19:33

## 2024-11-12 RX ADMIN — METHADONE HYDROCHLORIDE 40 MG: 10 TABLET ORAL at 06:15

## 2024-11-12 RX ADMIN — SENNOSIDES AND DOCUSATE SODIUM 1 TABLET: 50; 8.6 TABLET ORAL at 19:33

## 2024-11-12 RX ADMIN — INSULIN LISPRO 1 UNITS: 100 INJECTION, SOLUTION INTRAVENOUS; SUBCUTANEOUS at 19:34

## 2024-11-12 RX ADMIN — GABAPENTIN 100 MG: 100 CAPSULE ORAL at 13:38

## 2024-11-12 RX ADMIN — RIFAXIMIN 400 MG: 200 TABLET ORAL at 18:19

## 2024-11-12 RX ADMIN — GABAPENTIN 100 MG: 100 CAPSULE ORAL at 08:11

## 2024-11-12 RX ADMIN — RIFAXIMIN 400 MG: 200 TABLET ORAL at 19:32

## 2024-11-12 RX ADMIN — SODIUM CHLORIDE, PRESERVATIVE FREE 10 ML: 5 INJECTION INTRAVENOUS at 08:16

## 2024-11-12 RX ADMIN — LACTULOSE 20 G: 20 SOLUTION ORAL at 08:14

## 2024-11-12 RX ADMIN — METHADONE HYDROCHLORIDE 40 MG: 10 TABLET ORAL at 18:18

## 2024-11-12 RX ADMIN — CARVEDILOL 25 MG: 25 TABLET, FILM COATED ORAL at 18:18

## 2024-11-12 RX ADMIN — METHOCARBAMOL TABLETS 750 MG: 750 TABLET, COATED ORAL at 13:38

## 2024-11-12 RX ADMIN — SENNOSIDES AND DOCUSATE SODIUM 1 TABLET: 50; 8.6 TABLET ORAL at 08:12

## 2024-11-12 RX ADMIN — METHOCARBAMOL TABLETS 750 MG: 750 TABLET, COATED ORAL at 08:12

## 2024-11-12 RX ADMIN — OXYCODONE 10 MG: 5 TABLET ORAL at 03:43

## 2024-11-12 RX ADMIN — PANTOPRAZOLE SODIUM 40 MG: 40 TABLET, DELAYED RELEASE ORAL at 18:18

## 2024-11-12 RX ADMIN — RIFAXIMIN 400 MG: 200 TABLET ORAL at 08:14

## 2024-11-12 RX ADMIN — CARVEDILOL 25 MG: 25 TABLET, FILM COATED ORAL at 08:11

## 2024-11-12 RX ADMIN — OXYCODONE 10 MG: 5 TABLET ORAL at 19:33

## 2024-11-12 RX ADMIN — OXYCODONE 10 MG: 5 TABLET ORAL at 13:36

## 2024-11-12 RX ADMIN — GABAPENTIN 100 MG: 100 CAPSULE ORAL at 19:33

## 2024-11-12 RX ADMIN — POLYETHYLENE GLYCOL 3350 17 G: 17 POWDER, FOR SOLUTION ORAL at 08:13

## 2024-11-12 RX ADMIN — METHOCARBAMOL TABLETS 750 MG: 750 TABLET, COATED ORAL at 03:43

## 2024-11-12 RX ADMIN — METHOCARBAMOL TABLETS 750 MG: 750 TABLET, COATED ORAL at 19:33

## 2024-11-12 ASSESSMENT — PAIN SCALES - GENERAL
PAINLEVEL_OUTOF10: 8
PAINLEVEL_OUTOF10: 8
PAINLEVEL_OUTOF10: 5
PAINLEVEL_OUTOF10: 2
PAINLEVEL_OUTOF10: 2
PAINLEVEL_OUTOF10: 8
PAINLEVEL_OUTOF10: 5
PAINLEVEL_OUTOF10: 6

## 2024-11-12 ASSESSMENT — PAIN DESCRIPTION - LOCATION
LOCATION: NECK
LOCATION: NECK
LOCATION: BACK;NECK
LOCATION: NECK
LOCATION: NECK

## 2024-11-12 ASSESSMENT — PAIN DESCRIPTION - PAIN TYPE
TYPE: CHRONIC PAIN

## 2024-11-12 ASSESSMENT — PAIN - FUNCTIONAL ASSESSMENT
PAIN_FUNCTIONAL_ASSESSMENT: ACTIVITIES ARE NOT PREVENTED
PAIN_FUNCTIONAL_ASSESSMENT: PREVENTS OR INTERFERES SOME ACTIVE ACTIVITIES AND ADLS

## 2024-11-12 ASSESSMENT — PAIN DESCRIPTION - ONSET
ONSET: ON-GOING

## 2024-11-12 ASSESSMENT — PAIN DESCRIPTION - DIRECTION
RADIATING_TOWARDS: BACK
RADIATING_TOWARDS: UPPER BACK

## 2024-11-12 ASSESSMENT — LIFESTYLE VARIABLES: SMOKING_STATUS: 1

## 2024-11-12 ASSESSMENT — PAIN DESCRIPTION - DESCRIPTORS
DESCRIPTORS: ACHING
DESCRIPTORS: STABBING
DESCRIPTORS: ACHING
DESCRIPTORS: SHARP
DESCRIPTORS: ACHING

## 2024-11-12 ASSESSMENT — PAIN DESCRIPTION - ORIENTATION
ORIENTATION: POSTERIOR
ORIENTATION: MID
ORIENTATION: MID

## 2024-11-12 ASSESSMENT — PAIN DESCRIPTION - FREQUENCY
FREQUENCY: CONTINUOUS

## 2024-11-12 NOTE — PROGRESS NOTES
Lab called with a critical WBC of 1.2 and absolute neutrophil of 0.8. MD aware. No new orders at this time.

## 2024-11-12 NOTE — PROGRESS NOTES
[facility] Progress Note    Primary Cardiologist: Dr Matta    CC/HPI: AF, NICM    S: No complaints. No events overnight     Tele: AF HR 94    O:  Physical Exam:  /75   Pulse 93   Temp 97.4 °F (36.3 °C) (Oral)   Resp 14   Ht 1.854 m (6' 1\")   Wt 84.8 kg (187 lb)   SpO2 98%   BMI 24.67 kg/m²    General (appearance):  No acute distress  Eyes: anicteric   Neck: soft, No JVD  Ears/Nose/Mouth/Thorat: No cyanosis  CV: Irreg, irreg   Respiratory:  clear, normal effort  GI: soft, non-tender, non-distended  Skin: Warm, dry. No rashes  Neuro/Psych: Alert and oriented x 3. Appropriate behavior  Ext:  No c/c.     I.O's= +4.4 L     Weight  Admission: Weight - Scale: 85.3 kg (188 lb)   Today: Weight - Scale: 84.8 kg (187 lb)    CBC:   Recent Labs     11/10/24  0719 24  0731 24  0549   WBC 2.7* 1.6* 1.2*   HGB 11.2* 10.9* 10.9*   HCT 36.2* 35.1* 34.8*   MCV 87.6 87.3 87.5   PLT 55* 52* 49*     BMP:   Recent Labs     11/10/24  0719 24  0731 24  0549   * 138 139   K 5.3* 5.1 5.3*    109 110   CO2 17* 22 23   PHOS 3.2 3.5 4.3   BUN 57* 68* 73*   CREATININE 2.3* 2.4* 2.9*     Estimated Creatinine Clearance: 26 mL/min (A) (based on SCr of 2.9 mg/dL (H)).  Mag: No results found for: \"MG\"  LIVER PROFILE: No results for input(s): \"AST\", \"ALT\", \"LIPASE\", \"AMYLASE\", \"BILIDIR\", \"BILITOT\", \"ALKPHOS\" in the last 72 hours.    Invalid input(s): \"ALB\"  Pro-BNP:   Lab Results   Component Value Date/Time    PROBNP 3,154 2024 07:31 AM     High Sensitivity Troponin:   No results found for: \"CKTOTAL\", \"CKMB\", \"CKMBINDEX\", \"TROPONINI\", \"TROPHS\"    Imagin2024 Echo    Left Ventricle: Moderately reduced left ventricular systolic function with a visually estimated EF of 35 - 40%. Left ventricle is dilated. Normal wall thickness. Global hypokinesis present.    Right Ventricle: Normal systolic function.    Aortic Valve: Trileaflet valve. Mild to moderate

## 2024-11-12 NOTE — CARE COORDINATION
Patient is from home with spouse, independent pta.  For now pt plans to return home, did well with therapy.  Will need post op recommendations.  Patient tentatively going to OR on Wednesday.  CM will continue to follow.    Alicia Corley RN, BSN,    Ortho/Neuro   [phone]

## 2024-11-12 NOTE — PROGRESS NOTES
ONCOLOGY HEMATOLOGY CARE PROGRESS NOTE      SUBJECTIVE:    He is resting quietly at the time of my evaluation. No family at bedside. Hoping to go to the OR tomorrow         OBJECTIVE        Physical    VITALS:  Patient Vitals for the past 24 hrs:   BP Temp Temp src Pulse Resp SpO2   11/12/24 0811 137/75 97.4 °F (36.3 °C) Oral 93 14 98 %   11/12/24 0645 -- -- -- -- 16 --   11/12/24 0413 -- -- -- -- 16 --   11/12/24 0343 -- -- -- -- 16 --   11/12/24 0324 114/72 97.8 °F (36.6 °C) Oral 96 16 98 %   11/11/24 2359 -- -- -- -- 16 --   11/11/24 2329 -- -- -- -- 16 --   11/11/24 2324 107/75 97.7 °F (36.5 °C) Temporal 91 16 97 %   11/11/24 2201 -- -- -- 80 -- --   11/11/24 2135 104/67 97.3 °F (36.3 °C) Temporal 88 12 96 %   11/11/24 2000 -- -- -- 81 -- --   11/11/24 1955 103/70 97.7 °F (36.5 °C) Temporal 93 16 97 %   11/11/24 1820 -- -- -- -- 16 --   11/11/24 1750 -- -- -- -- 16 --   11/11/24 1612 109/69 98.2 °F (36.8 °C) Oral 87 16 96 %   11/11/24 1234 -- -- -- -- 16 --   11/11/24 1204 -- -- -- -- 16 --   11/11/24 1154 129/80 98.3 °F (36.8 °C) Oral -- 16 98 %       24HR INTAKE/OUTPUT:    Intake/Output Summary (Last 24 hours) at 11/12/2024 1103  Last data filed at 11/12/2024 0932  Gross per 24 hour   Intake 1230 ml   Output 400 ml   Net 830 ml     CONSTITUTIONAL:  no apparent distress   ENT: Normocephalic, without obvious abnormality, atraumatic  NECK: supple, symmetrical, no jugular venous distension   LUNGS: no increased work of breathing   CARDIOVASCULAR: regular rate and rhythm on monitor   NEUROLOGIC: asleep   SKIN: Normal skin color, texture, turgor and no jaundice. appears intact   EXTREMITIES: no LE edema      DATA:  CBC:    Recent Labs     11/12/24  0549 11/11/24  0731 11/10/24  0719 11/09/24  0627 11/07/24  1023 11/06/24  1046   WBC 1.2* 1.6* 2.7* 1.7*   < > 1.0*   NEUTROABS 0.7*  --  2.4 1.5*  --  0.5*   LYMPHOPCT 31.0  --  5.4 8.4  --  31.1   RBC 3.98* 4.02* 4.13* 3.98*   < >  Tatianna Valdovinosster (:  1960) is a 62 y.o. female,Established patient, here for evaluation of the following chief complaint(s): Depression         ASSESSMENT/PLAN:  Santo Archer was seen today for depression. Diagnoses and all orders for this visit:    Anxiety    pt is doing fine off the prozac. Long dw her about use of xanax . She was taking it for anxiety with driving. I explained that is not appropriate use of that med  Best not to drive when taking it as it may affect reflexes etc.  She also did not like the atarax. Currently not having to drive to work and she is feeling better. Working from home  Follow up 6m  Has mult meds rx by cardiology    SUBJECTIVE/OBJECTIVE:  HPI  Follow up for mood  Only took prozac for 2 week  Made her dizzy  Has not been having anxiety bc working from home  Says that the atarax made her nauseated  She likes the xanax    Has not taken the alprazolam this yr . Not having to drive to work. Does not like going out of the house house. 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 Negative for unexpected weight change. Psychiatric/Behavioral: Negative for dysphoric mood. The patient is nervous/anxious. Patient-Reported Vitals 2021   Patient-Reported Weight 121.8lbs        Physical Exam      Kimberlee Molina, was evaluated through a synchronous (real-time) audio-video encounter. The patient (or guardian if applicable) is aware that this is a billable service. Verbal consent to proceed has been obtained within the past 12 months. The visit was conducted pursuant to the emergency declaration under the [facility], [address] [place] [address] authority and the [employer] Act. Patient identification was verified, and a caregiver was present when appropriate.  The patient was located in a state where the provider was credentialed to provide care. An electronic signature was used to authenticate this note.     --Randa Vazquez MD

## 2024-11-12 NOTE — CONSENT
Informed Consent for Blood Component Transfusion Note    I have discussed with the patient the rationale for blood component transfusion; its benefits in treating or preventing fatigue, organ damage, or death; and its risk which includes mild transfusion reactions, rare risk of blood borne infection, or more serious but rare reactions. I have discussed the alternatives to transfusion, including the risk and consequences of not receiving transfusion. The patient had an opportunity to ask questions and had agreed to proceed with transfusion of blood components.    Electronically signed by CHARISSA Aguirre CNP on 11/12/24 at 9:46 AM EST

## 2024-11-12 NOTE — PROGRESS NOTES
Occupational Therapy  Occupational Therapy  Daily Treatment Note  Patient Name: Barrera Carbone  MRN: [id number]    Chart Reviewed: Yes       Other position/activity restrictions: Up w/ assist     Additional Pertinent Hx: pt is a 74 y.o. male with pmh of pancytopenia who presents with Cervical myelopathy (HCC) - awaiting C3-C6 decompression fusion and fixation surgery. Patient currently neutropenic and thrombocytopenic.        Diagnosis: Cervical Myelopathy  Treatment Diagnosis: impaired ADLs/transfers    Subjective: Pt sitting up in bed with HOB raised and LEs on EOB. Pt needs to urinate and is agreeable to therapy session.    Pain: 8/0 back, RN administered pain meds prior to therapy session    Social/Functional History  Lives With: Spouse  Type of Home: House  Home Layout: Multi-level (trilevel with 6-8 steps between each level)  Home Access: Stairs to enter with rails  Entrance Stairs - Number of Steps: to enter: 1 + 6 steps; bilateral rails for steps - 7-8 steps between ea level (working on getting a stair lift, but not yet installed)  Entrance Stairs - Rails: Both  Bathroom Shower/Tub: Walk-in shower  Bathroom Toilet: Standard (RTS - put walker over top to use as rails)  Bathroom Equipment: Grab bars in shower, Shower chair, Hand-held shower  Bathroom Accessibility: Accessible  Home Equipment: Walker - Rolling, Wheelchair - Manual, Cane (wheelchair on each floor)  Has the patient had two or more falls in the past year or any fall with injury in the past year?: Yes  Receives Help From: Family (wife, nephew, niece)  ADL Assistance:  (grandson assists w/ showers (back/feet); having incontinence issues; nephew assists w/ pants (most difficulty threading feet into pants); able to toilet IND)  Homemaking Assistance: Needs assistance (wife cooks; when pt cooks - sits on stool)  Ambulation Assistance: Independent (using RW; short household distances)  Transfer Assistance: Independent  Active : Yes  Patient's

## 2024-11-12 NOTE — PLAN OF CARE
Problem: Pain  Goal: Verbalizes/displays adequate comfort level or baseline comfort level  11/11/2024 1911 by Qi Guevara, RN  Outcome: Progressing  Note: Pt. Managing pain per MAR.     Problem: Safety - Adult  Goal: Free from fall injury  11/11/2024 1911 by Qi Guevara, RN  Outcome: Progressing  Note: All fall precautions in place and call light is within reach.

## 2024-11-12 NOTE — PLAN OF CARE
Problem: Pain  Goal: Verbalizes/displays adequate comfort level or baseline comfort level  Outcome: Progressing  Flowsheets (Taken 11/12/2024 1504)  Verbalizes/displays adequate comfort level or baseline comfort level:   Encourage patient to monitor pain and request assistance   Administer analgesics based on type and severity of pain and evaluate response   Assess pain using appropriate pain scale   Implement non-pharmacological measures as appropriate and evaluate response  Note: Endorsing back and neck pain, managed with medications per MAR. Educated on medication schedule and to notify RN of worsening pain. Call light left within reach. Plan of care continues.     Problem: Safety - Adult  Goal: Free from fall injury  Outcome: Progressing  Flowsheets (Taken 11/11/2024 0815 by Glo Staples, RN)  Free From Fall Injury: Instruct family/caregiver on patient safety  Note: Fall precautions in place. Call light left within reach. Plan of care continues.

## 2024-11-12 NOTE — PROGRESS NOTES
Platelet consent form signed and consent obtained via telephone from patient's wife. Platelets started with second RN. Patient educated on signs of a reaction and when to notify RN. Blood bank called before transfusion due to platelets showing up A+ when the patient is A-. Luz Maria Reid from blood bank stated that it was still okay to transfuse, policy sent up to bedside. No reactions so far per patient. VSS on room air. Plan of care continues.

## 2024-11-12 NOTE — PROGRESS NOTES
Neurosurgery Progress Note    Patient seen and examined on 11/11/24. No acute events overnight.  Neurologically stable on exam. WBC down to 1.2 and ANC 0.7 today, and platelets dropped to 49. Will need platelets pre-op and post-op.    A/P: 73 yo man with cervical myelopathy but with thrombocytopenia and neutropenia.     -Platelets (2 units). Check CBC after transfusions complete  -Pain control with PO meds  -Muscle relaxants prn  -Hematology following working on pancytopenia  -Hospitalist following  -DVT ppx with SCDs  -Will reassess for C3-6 decompression pending correction of hematologic abnormalities.  Likely consider OR Wednesday.      CHARISSA Aguirre - CNP   Discussed pt with Dr. Prater and he agrees with plan.

## 2024-11-12 NOTE — ANESTHESIA PRE PROCEDURE
Department of Anesthesiology  Preprocedure Note       Name:  Barrera Carbone   Age:  74 y.o.  :  1950                                          MRN:  [id number]         Date:  2024      Surgeon: Surgeon(s):  Colten Prater MD    Procedure: Procedure(s):  CERVICAL 3-CERVICAL 6 POSTERIOR CERVICAL DECOMPRESSION FUSION AND FIXATION    Medications prior to admission:   Prior to Admission medications    Medication Sig Start Date End Date Taking? Authorizing Provider   ferrous sulfate (IRON 325) 325 (65 Fe) MG tablet Take 1 tablet by mouth every 48 hours 10/4/24  Yes Rosa Elena Sparks MD   omeprazole (PRILOSEC) 20 MG delayed release capsule Take 1 capsule by mouth daily   Yes Rosa Elena Sparks MD   carvedilol (COREG) 12.5 MG tablet Take 1 tablet by mouth 2 times daily (with meals) Pt takes 1/2 tablet BID   Yes Rosa Elena Sparks MD   methadone (METHADOSE) 40 MG disintegrating tablet Take 1 tablet by mouth in the morning and 1 tablet in the evening.   Yes Rosa Elena Sparks MD   acetaminophen (TYLENOL) 325 MG tablet Take by mouth every 6 hours as needed 24  Yes Provider, MD Rosa Elena   albuterol sulfate HFA (PROVENTIL;VENTOLIN;PROAIR) 108 (90 Base) MCG/ACT inhaler Inhale 3 puffs into the lungs every 4 hours as needed for Shortness of Breath or Wheezing 24  Yes Rosa Elena Sparks MD   allopurinol (ZYLOPRIM) 100 MG tablet Take 1 tablet by mouth 24  Yes Rosa Elena Sparks MD   docusate (COLACE, DULCOLAX) 100 MG CAPS Take 100 mg by mouth 24  Yes Rosa Elena Sparks MD   lactulose (CEPHULAC) 10 g packet Take 1 packet by mouth 3 times daily 13  Yes Rosa Elena Sparks MD   tiotropium-olodaterol (STIOLTO) 2.5-2.5 MCG/ACT AERS Inhale 2 puffs into the lungs daily 24  Yes Rosa Elena Sparks MD   diclofenac sodium (VOLTAREN) 1 % GEL Apply 4 g topically 4 times daily 3/12/24  Yes Agustín Lomeli MD   empagliflozin (JARDIANCE) 25 MG tablet Take 1 tablet by mouth

## 2024-11-12 NOTE — PROGRESS NOTES
Patient is alert and oriented x4. VSS on room air. Medications given per MAR, no side effects noted. Patient ambulating x1 with gait belt and walker. Patient endorsing pain to neck and legs. Continuing to monitor and manage per MAR. Voiding well via BRP, no bowel movement this shift.      Patient is currently resting in bed with bed alarm on for safety. Call light within reach and all fall precautions in place. Plan of care continues.

## 2024-11-12 NOTE — PROGRESS NOTES
V2.0    [facility] [place]      Name:  Barrera Carbone /Age/Sex: 1950  (74 y.o. male)   MRN & CSN:  [id number] & [id number] Encounter Date/Time: 2024 6:25 AM EST   Location:  [facility]- PCP: No primary care provider on file.     Attending:Colten Prater MD       Hospital Day: 8    Assessment and Recommendations       Assessment/Plan:      Pancytopenia:  Reviewed note from oncology greatly appreciate their input  Patient has a allergy to interferon so therefore cannot start him on G-CSF  Patient was started on dexamethasone 40 mg daily  WBC 1.2    Thrombocytopenia:  As noted by oncology/hematology likely secondary to peripheral sequestration in addition by splenomegaly versus ITP.  Appears that at least 2 lines of hematological parameters are abnormal and the possibility of underlying MDS/MPD cannot be completely ruled out  The diagnosis would require bone marrow aspiration and biopsy which could be done outpatient  Given the fact patient is in need of surgery patient is being started on dexamethasone 40 mg daily for 4 days and monitor platelet count  Patient will need transfusions prior to surgery if his counts are not in the  K region  Little change in numbers at this time 49K. Platelet transfusions ordered.     Liver cirrhosis secondary to hepatitis C:  Treated in the past  Ultrasound of his abdomen has revealed an enlarged liver  Hepatitis C viral load has been ordered    Cervical spine degenerative disc disease:  Plan for CT T3-6 posterior cervical decompression fusion and fixation per neurosurgery  Patient with marked increased pain yesterday morning   Currently on p.o. medication as needed  Patient started on gabapentin 100 mg 3 times daily  Anticipate going to surgery next week  Goal is resolved neutropenia and have platelets greater than 100,000  PT/OT  Additionally IV Dilaudid as needed and will monitor for excessive sedation and or respiratory depression  His pain is

## 2024-11-12 NOTE — PROGRESS NOTES
Pt. Oriented x4, can be intermittently confused. VSS on RA. Pt. Managing pain per MAR. Pt. Drowsy at start of shift, pt. Awakens to stimuli but has difficulty keeping eyes open. Hospitalist NP notified, ABG ordered, results relayed to NP, no new orders. Pt. Voiding well via BRP. All fall precautions in place and call light is within reach.

## 2024-11-12 NOTE — CONSULTS
noted  Cardiovascular:  S1, S2 without m/r/g  Respiratory: CTA B without w/r/r  Abdomen:  , soft, nt, nd  Ext:  lower extremity edema Yes  Psychiatric: mood and affect stable     Data:   Labs:  CBC:   Recent Labs     11/10/24  0719 11/11/24  0731 11/12/24  0549   WBC 2.7* 1.6* 1.2*   HGB 11.2* 10.9* 10.9*   PLT 55* 52* 49*     BMP:    Recent Labs     11/10/24  0719 11/11/24  0731 11/12/24  0549   * 138 139   K 5.3* 5.1 5.3*    109 110   CO2 17* 22 23   BUN 57* 68* 73*   CREATININE 2.3* 2.4* 2.9*   GLUCOSE 115* 101* 95     Ca/Mg/Phos:   Recent Labs     11/10/24  0719 11/11/24  0731 11/12/24  0549   CALCIUM 9.4 9.4 9.1   PHOS 3.2 3.5 4.3     Hepatic: No results for input(s): \"AST\", \"ALT\", \"BILITOT\", \"ALKPHOS\" in the last 72 hours.    Invalid input(s): \"ALB\"  Troponin: No results for input(s): \"TROPONINI\" in the last 72 hours.  BNP: No results for input(s): \"BNP\" in the last 72 hours.  Lipids: No results for input(s): \"CHOL\", \"TRIG\", \"HDL\" in the last 72 hours.    Invalid input(s): \"LDLCALC\", \"LABVLDL\"  ABGs:   Recent Labs     11/11/24  2201   PHART 7.248*   PO2ART 78.6   [id number] 46.3*     INR: No results for input(s): \"INR\" in the last 72 hours.  UA:No results for input(s): \"COLORU\", \"CLARITYU\", \"GLUCOSEU\", \"BILIRUBINUR\", \"KETUA\", \"SPECGRAV\", \"BLOODU\", \"PHUR\", \"PROTEINU\", \"UROBILINOGEN\", \"NITRU\", \"LEUKOCYTESUR\", \"URINETYPE\" in the last 72 hours.    Invalid input(s): \"LABMICR\"   Urine Microscopic: No results for input(s): \"LABCAST\", \"BACTERIA\", \"COMU\", \"HYALCAST\", \"WBCUA\", \"RBCUA\" in the last 72 hours.    Invalid input(s): \"EPIU\"  Urine Culture: No results for input(s): \"LABURIN\" in the last 72 hours.  Urine Chemistry: No results for input(s): \"CLUR\", \"LABCREA\", \"PROTEINUR\", \"NAUR\" in the last 72 hours.      IMAGING:  US ABDOMEN LIMITED Specify organ? LIVER, SPLEEN   Final Result   IMPRESSION :      Splenomegaly.         Electronically signed by Percy Raines      US RENAL COMPLETE    (Results Pending)          Medical Decision Making:  The following items were considered in medical decision making:  Discussion of patient care with other providers  Reviewed clinical lab tests  Reviewed radiology tests  Reviewed other diagnostic tests/interventions    Will be discussed w/  Primary team     Thank you for allowing us to participate in care of Barrera Carbone   Feel free to contact me,     Dakota Palma MD   [facility]  Office : [phone] or through [employer]  Fax :[phone]

## 2024-11-13 ENCOUNTER — ANESTHESIA (OUTPATIENT)
Dept: OPERATING ROOM | Age: 74
End: 2024-11-13
Payer: OTHER GOVERNMENT

## 2024-11-13 ENCOUNTER — APPOINTMENT (OUTPATIENT)
Dept: ULTRASOUND IMAGING | Age: 74
End: 2024-11-13
Attending: NEUROLOGICAL SURGERY
Payer: OTHER GOVERNMENT

## 2024-11-13 PROBLEM — N17.9 ACUTE KIDNEY INJURY SUPERIMPOSED ON STAGE 3B CHRONIC KIDNEY DISEASE (HCC): Status: ACTIVE | Noted: 2024-11-13

## 2024-11-13 PROBLEM — N18.32 ACUTE KIDNEY INJURY SUPERIMPOSED ON STAGE 3B CHRONIC KIDNEY DISEASE (HCC): Status: ACTIVE | Noted: 2024-11-13

## 2024-11-13 LAB
ALBUMIN SERPL-MCNC: 4 G/DL (ref 3.4–5)
ANION GAP SERPL CALCULATED.3IONS-SCNC: 8 MMOL/L (ref 3–16)
BASOPHILS # BLD: 0 K/UL (ref 0–0.2)
BASOPHILS NFR BLD: 0.1 %
BLOOD BANK DISPENSE STATUS: NORMAL
BLOOD BANK DISPENSE STATUS: NORMAL
BLOOD BANK PRODUCT CODE: NORMAL
BLOOD BANK PRODUCT CODE: NORMAL
BPU ID: NORMAL
BPU ID: NORMAL
BUN SERPL-MCNC: 66 MG/DL (ref 7–20)
CALCIUM SERPL-MCNC: 9.2 MG/DL (ref 8.3–10.6)
CHLORIDE SERPL-SCNC: 109 MMOL/L (ref 99–110)
CO2 SERPL-SCNC: 24 MMOL/L (ref 21–32)
CREAT SERPL-MCNC: 2.6 MG/DL (ref 0.8–1.3)
DEPRECATED RDW RBC AUTO: 16.8 % (ref 12.4–15.4)
DEPRECATED RDW RBC AUTO: 17.2 % (ref 12.4–15.4)
DESCRIPTION BLOOD BANK: NORMAL
DESCRIPTION BLOOD BANK: NORMAL
EOSINOPHIL # BLD: 0.1 K/UL (ref 0–0.6)
EOSINOPHIL NFR BLD: 3.9 %
GFR SERPLBLD CREATININE-BSD FMLA CKD-EPI: 25 ML/MIN/{1.73_M2}
GLUCOSE BLD-MCNC: 103 MG/DL (ref 70–99)
GLUCOSE BLD-MCNC: 121 MG/DL (ref 70–99)
GLUCOSE BLD-MCNC: 124 MG/DL (ref 70–99)
GLUCOSE BLD-MCNC: 82 MG/DL (ref 70–99)
GLUCOSE SERPL-MCNC: 90 MG/DL (ref 70–99)
HCT VFR BLD AUTO: 34.1 % (ref 40.5–52.5)
HCT VFR BLD AUTO: 35 % (ref 40.5–52.5)
HGB BLD-MCNC: 10.6 G/DL (ref 13.5–17.5)
HGB BLD-MCNC: 10.8 G/DL (ref 13.5–17.5)
LYMPHOCYTES # BLD: 0.2 K/UL (ref 1–5.1)
LYMPHOCYTES NFR BLD: 9.9 %
MCH RBC QN AUTO: 27.1 PG (ref 26–34)
MCH RBC QN AUTO: 27.2 PG (ref 26–34)
MCHC RBC AUTO-ENTMCNC: 31 G/DL (ref 31–36)
MCHC RBC AUTO-ENTMCNC: 31 G/DL (ref 31–36)
MCV RBC AUTO: 87.5 FL (ref 80–100)
MCV RBC AUTO: 87.6 FL (ref 80–100)
MONOCYTES # BLD: 0.2 K/UL (ref 0–1.3)
MONOCYTES NFR BLD: 9.6 %
NEUTROPHILS # BLD: 1.3 K/UL (ref 1.7–7.7)
NEUTROPHILS NFR BLD: 76.5 %
NT-PROBNP SERPL-MCNC: 2513 PG/ML (ref 0–124)
PERFORMED ON: ABNORMAL
PERFORMED ON: NORMAL
PHOSPHATE SERPL-MCNC: 3.9 MG/DL (ref 2.5–4.9)
PLATELET # BLD AUTO: 60 K/UL (ref 135–450)
PLATELET # BLD AUTO: 90 K/UL (ref 135–450)
PMV BLD AUTO: 8.5 FL (ref 5–10.5)
PMV BLD AUTO: 9.3 FL (ref 5–10.5)
POTASSIUM SERPL-SCNC: 5.8 MMOL/L (ref 3.5–5.1)
RBC # BLD AUTO: 3.89 M/UL (ref 4.2–5.9)
RBC # BLD AUTO: 4 M/UL (ref 4.2–5.9)
SODIUM SERPL-SCNC: 141 MMOL/L (ref 136–145)
WBC # BLD AUTO: 1.2 K/UL (ref 4–11)
WBC # BLD AUTO: 1.7 K/UL (ref 4–11)

## 2024-11-13 PROCEDURE — 76770 US EXAM ABDO BACK WALL COMP: CPT

## 2024-11-13 PROCEDURE — 83880 ASSAY OF NATRIURETIC PEPTIDE: CPT

## 2024-11-13 PROCEDURE — 6370000000 HC RX 637 (ALT 250 FOR IP): Performed by: NURSE PRACTITIONER

## 2024-11-13 PROCEDURE — 36430 TRANSFUSION BLD/BLD COMPNT: CPT

## 2024-11-13 PROCEDURE — 85025 COMPLETE CBC W/AUTO DIFF WBC: CPT

## 2024-11-13 PROCEDURE — 2060000000 HC ICU INTERMEDIATE R&B

## 2024-11-13 PROCEDURE — 85027 COMPLETE CBC AUTOMATED: CPT

## 2024-11-13 PROCEDURE — 6370000000 HC RX 637 (ALT 250 FOR IP): Performed by: HOSPITALIST

## 2024-11-13 PROCEDURE — 80069 RENAL FUNCTION PANEL: CPT

## 2024-11-13 PROCEDURE — 6370000000 HC RX 637 (ALT 250 FOR IP): Performed by: INTERNAL MEDICINE

## 2024-11-13 PROCEDURE — 2580000003 HC RX 258: Performed by: ANESTHESIOLOGY

## 2024-11-13 PROCEDURE — 36415 COLL VENOUS BLD VENIPUNCTURE: CPT

## 2024-11-13 RX ORDER — SODIUM CHLORIDE 9 MG/ML
INJECTION, SOLUTION INTRAVENOUS
Status: SHIPPED | OUTPATIENT
Start: 2024-11-13

## 2024-11-13 RX ORDER — SODIUM CHLORIDE 9 MG/ML
INJECTION, SOLUTION INTRAVENOUS PRN
Status: DISCONTINUED | OUTPATIENT
Start: 2024-11-13 | End: 2024-11-15

## 2024-11-13 RX ADMIN — METHOCARBAMOL TABLETS 750 MG: 750 TABLET, COATED ORAL at 14:06

## 2024-11-13 RX ADMIN — OXYCODONE 10 MG: 5 TABLET ORAL at 15:37

## 2024-11-13 RX ADMIN — SODIUM ZIRCONIUM CYCLOSILICATE 15 G: 10 POWDER, FOR SUSPENSION ORAL at 10:25

## 2024-11-13 RX ADMIN — SODIUM CHLORIDE, PRESERVATIVE FREE 10 ML: 5 INJECTION INTRAVENOUS at 20:53

## 2024-11-13 RX ADMIN — LACTULOSE 20 G: 20 SOLUTION ORAL at 20:52

## 2024-11-13 RX ADMIN — GABAPENTIN 100 MG: 100 CAPSULE ORAL at 08:58

## 2024-11-13 RX ADMIN — CARVEDILOL 25 MG: 25 TABLET, FILM COATED ORAL at 16:58

## 2024-11-13 RX ADMIN — METHOCARBAMOL TABLETS 750 MG: 750 TABLET, COATED ORAL at 20:02

## 2024-11-13 RX ADMIN — METHADONE HYDROCHLORIDE 40 MG: 10 TABLET ORAL at 18:16

## 2024-11-13 RX ADMIN — LACTULOSE 20 G: 20 SOLUTION ORAL at 08:58

## 2024-11-13 RX ADMIN — PANTOPRAZOLE SODIUM 40 MG: 40 TABLET, DELAYED RELEASE ORAL at 16:58

## 2024-11-13 RX ADMIN — RIFAXIMIN 400 MG: 200 TABLET ORAL at 14:08

## 2024-11-13 RX ADMIN — SODIUM CHLORIDE, PRESERVATIVE FREE 10 ML: 5 INJECTION INTRAVENOUS at 09:47

## 2024-11-13 RX ADMIN — CARVEDILOL 25 MG: 25 TABLET, FILM COATED ORAL at 08:59

## 2024-11-13 RX ADMIN — SODIUM CHLORIDE: 9 INJECTION, SOLUTION INTRAVENOUS at 09:04

## 2024-11-13 RX ADMIN — GABAPENTIN 100 MG: 100 CAPSULE ORAL at 14:06

## 2024-11-13 RX ADMIN — SODIUM ZIRCONIUM CYCLOSILICATE 15 G: 10 POWDER, FOR SUSPENSION ORAL at 14:07

## 2024-11-13 RX ADMIN — GABAPENTIN 100 MG: 100 CAPSULE ORAL at 20:53

## 2024-11-13 RX ADMIN — METHOCARBAMOL TABLETS 750 MG: 750 TABLET, COATED ORAL at 04:31

## 2024-11-13 RX ADMIN — SENNOSIDES AND DOCUSATE SODIUM 1 TABLET: 50; 8.6 TABLET ORAL at 08:58

## 2024-11-13 RX ADMIN — POLYETHYLENE GLYCOL 3350 17 G: 17 POWDER, FOR SOLUTION ORAL at 09:46

## 2024-11-13 RX ADMIN — SODIUM ZIRCONIUM CYCLOSILICATE 15 G: 10 POWDER, FOR SUSPENSION ORAL at 20:52

## 2024-11-13 RX ADMIN — METHADONE HYDROCHLORIDE 40 MG: 10 TABLET ORAL at 06:15

## 2024-11-13 RX ADMIN — METHOCARBAMOL TABLETS 750 MG: 750 TABLET, COATED ORAL at 08:59

## 2024-11-13 RX ADMIN — RIFAXIMIN 400 MG: 200 TABLET ORAL at 09:46

## 2024-11-13 RX ADMIN — OXYCODONE 10 MG: 5 TABLET ORAL at 00:15

## 2024-11-13 RX ADMIN — SENNOSIDES AND DOCUSATE SODIUM 1 TABLET: 50; 8.6 TABLET ORAL at 20:53

## 2024-11-13 RX ADMIN — OXYCODONE 10 MG: 5 TABLET ORAL at 04:31

## 2024-11-13 RX ADMIN — RIFAXIMIN 400 MG: 200 TABLET ORAL at 20:53

## 2024-11-13 ASSESSMENT — PAIN DESCRIPTION - ORIENTATION
ORIENTATION: LOWER;RIGHT
ORIENTATION: MID
ORIENTATION: LOWER;LEFT;RIGHT
ORIENTATION: MID

## 2024-11-13 ASSESSMENT — PAIN DESCRIPTION - FREQUENCY
FREQUENCY: CONTINUOUS

## 2024-11-13 ASSESSMENT — PAIN DESCRIPTION - DESCRIPTORS
DESCRIPTORS: ACHING;THROBBING
DESCRIPTORS: ACHING
DESCRIPTORS: ACHING;THROBBING
DESCRIPTORS: ACHING

## 2024-11-13 ASSESSMENT — PAIN DESCRIPTION - LOCATION
LOCATION: NECK;LEG
LOCATION: NECK
LOCATION: NECK;ARM;LEG
LOCATION: NECK

## 2024-11-13 ASSESSMENT — PAIN DESCRIPTION - ONSET
ONSET: ON-GOING

## 2024-11-13 ASSESSMENT — PAIN SCALES - GENERAL
PAINLEVEL_OUTOF10: 6
PAINLEVEL_OUTOF10: 3
PAINLEVEL_OUTOF10: 7
PAINLEVEL_OUTOF10: 7
PAINLEVEL_OUTOF10: 2
PAINLEVEL_OUTOF10: 7
PAINLEVEL_OUTOF10: 7
PAINLEVEL_OUTOF10: 8
PAINLEVEL_OUTOF10: 8

## 2024-11-13 ASSESSMENT — PAIN - FUNCTIONAL ASSESSMENT
PAIN_FUNCTIONAL_ASSESSMENT: ACTIVITIES ARE NOT PREVENTED
PAIN_FUNCTIONAL_ASSESSMENT: PREVENTS OR INTERFERES SOME ACTIVE ACTIVITIES AND ADLS
PAIN_FUNCTIONAL_ASSESSMENT: ACTIVITIES ARE NOT PREVENTED
PAIN_FUNCTIONAL_ASSESSMENT: PREVENTS OR INTERFERES SOME ACTIVE ACTIVITIES AND ADLS

## 2024-11-13 ASSESSMENT — PAIN DESCRIPTION - PAIN TYPE
TYPE: CHRONIC PAIN

## 2024-11-13 NOTE — PROGRESS NOTES
Electrophysiology - PROGRESS NOTE    Admit Date: 11/5/2024     Chief Complaint: AF/RVR     Interval History:   Patient seen and examined and notes reviewed. Patient is being followed for AF/RVR.  Patient had presented with imbalance and difficulty with fine motor movements.  He has progressive fine motor dysfunction and balance consistent with cervical myelopathy.  He was noted to have atrial fibrillation with RVR on telemetry.  Carvedilol was increased to 25 mg twice a day.  His heart rate has been controlled overnight.  He remains in atrial fibrillation.  He had an echo that showed his EF was 35 to 40%.  [facility] are [facility] to [facility] to determine if this is a new cardiomyopathy.  Patient is due to have a cervical decompression and fusion however this has been delayed due to low platelets.      In: 1559.7 [P.O.:930; Blood:629.7]  Out: -    Wt Readings from Last 2 Encounters:   11/09/24 84.8 kg (187 lb)   03/12/24 91.7 kg (202 lb 2.6 oz)       Data:   Scheduled Meds:   Scheduled Meds:   sodium zirconium cyclosilicate  15 g Oral TID    carvedilol  25 mg Oral BID WC    lactulose  20 g Oral BID    insulin lispro  0-4 Units SubCUTAneous 4x Daily AC & HS    pantoprazole  40 mg Oral QAM AC    gabapentin  100 mg Oral TID    methocarbamol  750 mg Oral Q6H    sodium chloride flush  5-40 mL IntraVENous 2 times per day    rifAXIMin  400 mg Oral TID    sennosides-docusate sodium  1 tablet Oral BID    polyethylene glycol  17 g Oral Daily    methadone  40 mg Oral Q12H     Continuous Infusions:   sodium chloride      sodium chloride       PRN Meds:.sodium chloride, sodium chloride, [DISCONTINUED] oxyCODONE **OR** oxyCODONE, [DISCONTINUED] HYDROmorphone **OR** HYDROmorphone, sodium chloride flush, ondansetron **OR** ondansetron, bisacodyl, diazePAM  Continuous Infusions:   sodium chloride      sodium chloride         Intake/Output Summary (Last 24 hours) at 11/13/2024  fall w/ cervical injury  Thrombocytopenia    75 y/o man with a h/o HTN, DM, CVA, Hep C, LEONA, renal cell CA, cirrhosis, end stage liver disease, s/p partial nephrectomy, anemia, thrombocytopenia, who p/w a fall w/ an injury to his head, planned for cervial decompression w/ fusion, however on hold d/t platelets, noted to be in AF, OAC on hold d/t low platelets  [id number] 5.     pAF  - In AF - HR controlled, no s/s  - No OAC d/t low platelets  - On carvedilol 25 mg BID  - Reviewed risk factors, pathophysiology, treatment options and lifestyle modification for atrial fibrillation: Blood pressure control, blood sugar control, healthy diet, minimal alcohol intake, no smoking, activity and exercise, manage stress sleep apnea evaluation and symptoms of a stroke.  - Keep K+ > 4.0 and Mg > 2.0  - Reviewed recent labs  - Sleep study outpt  - Echo w/ sev LAE - vol > 48  - Check TSH  - EP will sign off for now    Acute sCHF/NICMP  - EF 35-40%  - NYHA class II/III  - QRS 86  - On carvedilol 25 mg BID  - No ACE/ARB/ARNI/SGL2 due to CKD  - Avoid Jardiance and Entresto w/ upcoming surgery.   - BNP 2,513  - Being followed by gen cards  - Ischemic eval at some point  - Outpatient rhythm strategy after neurosurgery  - [facility]    HTN  - BP soft at times  - On carvedilol    Daron Arnett [facility]      I spent a total of 36 minutes in care of the patient and greater than 50% of the time was spent counseling with Barrera Carbone and coordinating care regarding their diagnosis, treatments and plan of care.

## 2024-11-13 NOTE — CARE COORDINATION
Patient is from home with spouse, independent pta.  Patient plans for neurosurgery, C3-6 decompression pending correction of hematologic abnormalities.  CM will continue to follow.    Alicia Corley RN, BSN,    Ortho/Neuro   [phone]

## 2024-11-13 NOTE — PLAN OF CARE
Problem: Pain  Goal: Verbalizes/displays adequate comfort level or baseline comfort level  11/12/2024 2308 by Qi Guevara, RN  Outcome: Progressing  Note: Pt. Managing pain per MAR.     Problem: Safety - Adult  Goal: Free from fall injury  11/12/2024 2308 by Qi Guevara, RN  Outcome: Progressing  Note: All fall precautions in place and call light is within reach.

## 2024-11-13 NOTE — PROGRESS NOTES
Patient is alert and oriented x4 with intermittent confusion. VSS on room air. Medications given per MAR, no side effects noted. Patient ambulating x1 with gait belt and walker. Endorses pain the neck, arms and legs. Continuing to monitor and manage per MAR. Voiding well via BRP, no bowel movement this shift.      Patient is currently resting in bed with bed alarm on for safety. Call light within reach and all fall precautions in place. Plan of care continues.

## 2024-11-13 NOTE — PROGRESS NOTES
Vitals:    11/12/24 1924   BP: 117/70   Pulse: 77   Resp: 16   Temp: 97.9 °F (36.6 °C)   SpO2: 97%         Pt is lethargic but able to be aroused from sleep, oriented x 3-4. Can be confused upon waking up. VSS on room air. Endorsing pain in neck, managed with medications per MAR. Voiding via BRP. No BM this shift. Tolerating PO diet, denies n/v. Ambulating 1x contact guard assist with gait belt and walker. Platelet infusion started as ordered with patient's spouse consent via telephone, no reactions during first 15 minutes. VSS during transfusion. No acute neuro changes. Denies further needs at this time. Pt currently resting in chair with eyes closed.     Fall precautions in place. Call light left within reach. Updated on plan of care. Plan of care continues.

## 2024-11-13 NOTE — PROGRESS NOTES
Neurosurgery Progress Note    Patient seen and examined on 11/11/24. No acute events overnight.  Neurologically stable on exam. WBC down to 1.7 and ANC 1.3 today, and platelets up to 90 after multiple units of platelets given yesterday. Will need platelets pre-op and post-op.    A/P: 73 yo man with cervical myelopathy but with thrombocytopenia and neutropenia.     -Check CBC daily  -Pain control with PO meds  -Muscle relaxants prn  -Hematology following working on pancytopenia  -Hospitalist following  -DVT ppx with SCDs  -Will reassess for C3-6 decompression pending correction of hematologic abnormalities.     Discussed pt with Dr. Prater and he agrees with plan.    Electronically signed by CHARISSA Aguirre - CNP on 11/13/2024 at 10:03 AM

## 2024-11-13 NOTE — PROGRESS NOTES
Pt. Oriented x4 with intermittent confusion. Pt. Managing pain per MAR. Pt. Made NPO @midnight. All fall precautions in place and call light is within reach.

## 2024-11-13 NOTE — PROGRESS NOTES
Pt. Finished platelet transfusion this AM @0645. Pt. Refusing lab draw this morning. MD notified verbally in pt. Room.

## 2024-11-13 NOTE — PLAN OF CARE
Notified by patient's RN that patient PLT count is 60.   Per NSGY note dated 11/8/24, keep PLT > 100K  Will order 2 more packs of platelets. RN instructed to transfuse them per protocol.  Will repeat CBC after both transfusions.  Discussed with JUAN Busby.

## 2024-11-13 NOTE — PLAN OF CARE
Problem: Discharge Planning  Goal: Discharge to home or other facility with appropriate resources  Outcome: Progressing  Flowsheets  Taken 11/13/2024 1200  Discharge to home or other facility with appropriate resources: Identify barriers to discharge with patient and caregiver  Taken 11/13/2024 0830  Discharge to home or other facility with appropriate resources: Identify barriers to discharge with patient and caregiver  Pt involved in discharge planning. Barriers to discharge discussed with patient. Discharge learning needs identified. Discuss with patient any additional needed resources and transportation plans. Case management following plan of care.      Problem: Pain  Goal: Verbalizes/displays adequate comfort level or baseline comfort level  11/13/2024 1208 by Glo Staples, RN  Outcome: Progressing  Pt endorsing pain to arms, neck, legs and back.   Being treated with PRN pain medication, rest, and frequent repositioning with pillow support for comfort and pressure relief. Pt reports some relief from pain with above interventions.    Problem: Safety - Adult  Goal: Free from fall injury  11/13/2024 1208 by Glo Staples, RN  Outcome: Progressing  Flowsheets (Taken 11/13/2024 1126)  Free From Fall Injury: Instruct family/caregiver on patient safety  All fall precautions in place. Bed locked and in lowest position with alarm on. Overbed table and personal belonings within reach. Call light within reach and patient instructed to use call light for assistance. Non-skid socks on.

## 2024-11-13 NOTE — PROGRESS NOTES
V2.0    [facility] Progress Note      Name:  Barrera Carbone /Age/Sex: 1950  (74 y.o. male)   MRN & CSN:  [id number] & [id number] Encounter Date/Time: 2024 6:25 AM EST   Location:  [facility]- PCP: No primary care provider on file.     Attending:Colten Prater MD       Hospital Day: 9    Assessment and Recommendations       Assessment/Plan:      Pancytopenia:  Reviewed note from oncology greatly appreciate their input  Patient has a allergy to interferon so therefore cannot start him on G-CSF  Patient was started on dexamethasone 40 mg daily  WBC 1.2    Thrombocytopenia:  As noted by oncology/hematology likely secondary to peripheral sequestration in addition by splenomegaly versus ITP.  Appears that at least 2 lines of hematological parameters are abnormal and the possibility of underlying MDS/MPD cannot be completely ruled out  The diagnosis would require bone marrow aspiration and biopsy which could be done outpatient  Given the fact patient is in need of surgery patient is being started on dexamethasone 40 mg daily for 4 days and monitor platelet count  Patient will need transfusions prior to surgery if his counts are not in the  K region  Platelet transfusions ordered. Continue to monitor Platelet levels. Tentative surgery tomorrow.     Liver cirrhosis secondary to hepatitis C:  Treated in the past  Ultrasound of his abdomen has revealed an enlarged liver  Hepatitis C viral load has been ordered    Cervical spine degenerative disc disease:  Plan for CT T3-6 posterior cervical decompression fusion and fixation per neurosurgery  Patient with marked increased pain yesterday morning   Currently on p.o. medication as needed  Patient started on gabapentin 100 mg 3 times daily  Anticipate going to surgery next week  Goal is resolved neutropenia and have platelets greater than 100,000  PT/OT  Additionally IV Dilaudid as needed and will monitor for excessive sedation and or respiratory

## 2024-11-13 NOTE — PROGRESS NOTES
[facility] Cardiology Progress Note    Primary Cardiologist: Dr Matta    CC/HPI: AF, NICM    S: No chest pain or sob    Tele: AF HR 94    O:  Physical Exam:  BP (!) 152/83   Pulse 86   Temp 97.9 °F (36.6 °C) (Oral)   Resp 14   Ht 1.854 m (6' 1\")   Wt 84.8 kg (187 lb)   SpO2 97%   BMI 24.67 kg/m²    General (appearance):  No acute distress  Eyes: anicteric   Neck: soft, No JVD  Ears/Nose/Mouth/Thorat: No cyanosis  CV: Irreg, irreg   Respiratory:  clear, normal effort  GI: soft, non-tender, non-distended  Skin: Warm, dry. No rashes  Neuro/Psych: Alert and oriented x 3. Appropriate behavior  Ext:  No c/c.     I.O's=     Weight  Admission: Weight - Scale: 85.3 kg (188 lb)   Today: Weight - Scale: 84.8 kg (187 lb)    CBC:   Recent Labs     24  0549 24  0005 24  0754   WBC 1.2* 1.2* 1.7*   HGB 10.9* 10.6* 10.8*   HCT 34.8* 34.1* 35.0*   MCV 87.5 87.6 87.5   PLT 49* 60* 90*     BMP:   Recent Labs     24  0731 24  0549 24  0754    139 141   K 5.1 5.3* 5.8*    110 109   CO2 22 23 24   PHOS 3.5 4.3 3.9   BUN 68* 73* 66*   CREATININE 2.4* 2.9* 2.6*     Estimated Creatinine Clearance: 29 mL/min (A) (based on SCr of 2.6 mg/dL (H)).  Mag: No results found for: \"MG\"  LIVER PROFILE: No results for input(s): \"AST\", \"ALT\", \"LIPASE\", \"AMYLASE\", \"BILIDIR\", \"BILITOT\", \"ALKPHOS\" in the last 72 hours.    Invalid input(s): \"ALB\"  Pro-BNP:   Lab Results   Component Value Date/Time    PROBNP 2,513 2024 07:54 AM    PROBNP 3,154 2024 07:31 AM     High Sensitivity Troponin:   No results found for: \"CKTOTAL\", \"CKMB\", \"CKMBINDEX\", \"TROPONINI\", \"TROPHS\"    Imagin2024 Echo    Left Ventricle: Moderately reduced left ventricular systolic function with a visually estimated EF of 35 - 40%. Left ventricle is dilated. Normal wall thickness. Global hypokinesis present.    Right Ventricle: Normal systolic function.    Aortic Valve: Trileaflet valve.

## 2024-11-13 NOTE — PROGRESS NOTES
Nephrology Note                                                                                                                                                                                                                                                                                                                                                               Office : [phone]     Fax :[phone]    Patient's Name: Barrera Carbone  11:11 AM  11/13/2024    Reason for Consult:  SUGAR  Requesting Physician:  No primary care provider on file.  Chief Complaint:  No chief complaint on file.      Assessment/Plan     Cervical myelopathy  C3-C6 decompression as per neurosurgery  SUGAR  Mild SUGAR from volume changes  Improving  Monitor creatinine and urine output   CKD 3b  Baseline creatinine around 2.0 with eGFR in 30s   He has CKD from cardiorenal, hepatorenal causes in addition to diabetic nephropathy  Solitary kidney  He has history of left nephrectomy  Right kidney has multiple cysts, renal US reviewed  MRI as outpatient  Hyperkalemia   Low K diet  Lokelma   Monitor   Cardiorenal syndrome   No overt signs of volume overload   Coreg  Cirrhosis  No acute signs of decompensation  On Lactulose and Rifaximin   Pancytopenia   Hematology on board     History of Present Ilness:    Barrera Carbone is a 74 y.o. male with  history of CAD on ASA who presented with imbalance and difficulty with fine motor movements. He reports neck and bilateral arm pain over the last 8 months with progressive numbness involving both upper and lower extremities. He notes whenever he extends his head he begins to have muscle spasms in the neck and the lower extremities. He has become progressively more unsteady during ambulation and has begun to use a walker simply to stand. He reports difficulty with fine motor movements of his hands and is no longer able to button buttons or use zippers. Patient has progressive fine motor dysfunction and  hours.    Invalid input(s): \"EPIU\"  Urine Culture: No results for input(s): \"LABURIN\" in the last 72 hours.  Urine Chemistry: No results for input(s): \"CLUR\", \"LABCREA\", \"PROTEINUR\", \"NAUR\" in the last 72 hours.      IMAGING:  US RENAL COMPLETE   Final Result   1. Magnetic resonance imaging without and with contrast is recommended (at the   minimum, noncontrast MRI) for further evaluation   2. Multiple renal cysts with no evidence of hydronephrosis            Electronically signed by Derian Huerta      US ABDOMEN LIMITED Specify organ? LIVER, SPLEEN   Final Result   IMPRESSION :      Splenomegaly.         Electronically signed by Percy Raines            Medical Decision Making:  The following items were considered in medical decision making:  Discussion of patient care with other providers  Reviewed clinical lab tests  Reviewed radiology tests  Reviewed other diagnostic tests/interventions    Will be discussed w/  Primary team     Thank you for allowing us to participate in care of Barrera Carbone   Feel free to contact me,     Dakota Palma MD   [facility]  Office : [phone] or through [employer]  Fax :[phone]

## 2024-11-14 PROBLEM — N18.4 CKD (CHRONIC KIDNEY DISEASE) STAGE 4, GFR 15-29 ML/MIN (HCC): Status: ACTIVE | Noted: 2024-11-14

## 2024-11-14 PROBLEM — I50.22 CHRONIC SYSTOLIC (CONGESTIVE) HEART FAILURE (HCC): Status: ACTIVE | Noted: 2024-11-14

## 2024-11-14 LAB
ALBUMIN SERPL-MCNC: 3.8 G/DL (ref 3.4–5)
ANION GAP SERPL CALCULATED.3IONS-SCNC: 10 MMOL/L (ref 3–16)
BASOPHILS # BLD: 0 K/UL (ref 0–0.2)
BASOPHILS NFR BLD: 0.2 %
BUN SERPL-MCNC: 59 MG/DL (ref 7–20)
CALCIUM SERPL-MCNC: 8.6 MG/DL (ref 8.3–10.6)
CHLORIDE SERPL-SCNC: 108 MMOL/L (ref 99–110)
CO2 SERPL-SCNC: 21 MMOL/L (ref 21–32)
CREAT SERPL-MCNC: 2.3 MG/DL (ref 0.8–1.3)
DEPRECATED RDW RBC AUTO: 17.2 % (ref 12.4–15.4)
EOSINOPHIL # BLD: 0.1 K/UL (ref 0–0.6)
EOSINOPHIL NFR BLD: 5 %
GFR SERPLBLD CREATININE-BSD FMLA CKD-EPI: 29 ML/MIN/{1.73_M2}
GLUCOSE BLD-MCNC: 107 MG/DL (ref 70–99)
GLUCOSE BLD-MCNC: 109 MG/DL (ref 70–99)
GLUCOSE SERPL-MCNC: 109 MG/DL (ref 70–99)
HCT VFR BLD AUTO: 34.7 % (ref 40.5–52.5)
HGB BLD-MCNC: 10.8 G/DL (ref 13.5–17.5)
LYMPHOCYTES # BLD: 0.3 K/UL (ref 1–5.1)
LYMPHOCYTES NFR BLD: 17.3 %
MCH RBC QN AUTO: 27.5 PG (ref 26–34)
MCHC RBC AUTO-ENTMCNC: 31 G/DL (ref 31–36)
MCV RBC AUTO: 88.7 FL (ref 80–100)
MONOCYTES # BLD: 0.2 K/UL (ref 0–1.3)
MONOCYTES NFR BLD: 12.2 %
NEUTROPHILS # BLD: 1.2 K/UL (ref 1.7–7.7)
NEUTROPHILS NFR BLD: 65.3 %
PERFORMED ON: ABNORMAL
PERFORMED ON: ABNORMAL
PHOSPHATE SERPL-MCNC: 3.1 MG/DL (ref 2.5–4.9)
PLATELET # BLD AUTO: 81 K/UL (ref 135–450)
PMV BLD AUTO: 9.1 FL (ref 5–10.5)
POTASSIUM SERPL-SCNC: 5 MMOL/L (ref 3.5–5.1)
RBC # BLD AUTO: 3.92 M/UL (ref 4.2–5.9)
SODIUM SERPL-SCNC: 139 MMOL/L (ref 136–145)
TSH SERPL DL<=0.005 MIU/L-ACNC: 1.87 UIU/ML (ref 0.27–4.2)
WBC # BLD AUTO: 1.8 K/UL (ref 4–11)

## 2024-11-14 PROCEDURE — 97530 THERAPEUTIC ACTIVITIES: CPT

## 2024-11-14 PROCEDURE — 85025 COMPLETE CBC W/AUTO DIFF WBC: CPT

## 2024-11-14 PROCEDURE — 6370000000 HC RX 637 (ALT 250 FOR IP): Performed by: NURSE PRACTITIONER

## 2024-11-14 PROCEDURE — 84443 ASSAY THYROID STIM HORMONE: CPT

## 2024-11-14 PROCEDURE — 6370000000 HC RX 637 (ALT 250 FOR IP): Performed by: INTERNAL MEDICINE

## 2024-11-14 PROCEDURE — 2580000003 HC RX 258: Performed by: ANESTHESIOLOGY

## 2024-11-14 PROCEDURE — 6360000002 HC RX W HCPCS: Performed by: INTERNAL MEDICINE

## 2024-11-14 PROCEDURE — 80069 RENAL FUNCTION PANEL: CPT

## 2024-11-14 PROCEDURE — 97535 SELF CARE MNGMENT TRAINING: CPT

## 2024-11-14 PROCEDURE — 2060000000 HC ICU INTERMEDIATE R&B

## 2024-11-14 PROCEDURE — 36415 COLL VENOUS BLD VENIPUNCTURE: CPT

## 2024-11-14 RX ORDER — HYDRALAZINE HYDROCHLORIDE 25 MG/1
25 TABLET, FILM COATED ORAL EVERY 8 HOURS SCHEDULED
Status: DISCONTINUED | OUTPATIENT
Start: 2024-11-14 | End: 2024-11-14

## 2024-11-14 RX ORDER — OXYCODONE AND ACETAMINOPHEN 5; 325 MG/1; MG/1
1 TABLET ORAL EVERY 8 HOURS PRN
Qty: 12 TABLET | Refills: 0 | Status: SHIPPED | OUTPATIENT
Start: 2024-11-14 | End: 2024-11-18

## 2024-11-14 RX ORDER — HYDRALAZINE HYDROCHLORIDE 20 MG/ML
10 INJECTION INTRAMUSCULAR; INTRAVENOUS EVERY 8 HOURS PRN
Status: DISCONTINUED | OUTPATIENT
Start: 2024-11-14 | End: 2024-11-15 | Stop reason: HOSPADM

## 2024-11-14 RX ORDER — DEXTROSE MONOHYDRATE 100 MG/ML
INJECTION, SOLUTION INTRAVENOUS CONTINUOUS PRN
Status: DISCONTINUED | OUTPATIENT
Start: 2024-11-14 | End: 2024-11-15 | Stop reason: HOSPADM

## 2024-11-14 RX ORDER — GLUCAGON 1 MG/ML
1 KIT INJECTION PRN
Status: DISCONTINUED | OUTPATIENT
Start: 2024-11-14 | End: 2024-11-15 | Stop reason: HOSPADM

## 2024-11-14 RX ADMIN — POLYETHYLENE GLYCOL 3350 17 G: 17 POWDER, FOR SOLUTION ORAL at 09:27

## 2024-11-14 RX ADMIN — GABAPENTIN 100 MG: 100 CAPSULE ORAL at 20:11

## 2024-11-14 RX ADMIN — CARVEDILOL 25 MG: 25 TABLET, FILM COATED ORAL at 09:27

## 2024-11-14 RX ADMIN — METHOCARBAMOL TABLETS 750 MG: 750 TABLET, COATED ORAL at 01:32

## 2024-11-14 RX ADMIN — LACTULOSE 20 G: 20 SOLUTION ORAL at 09:27

## 2024-11-14 RX ADMIN — LACTULOSE 20 G: 20 SOLUTION ORAL at 20:11

## 2024-11-14 RX ADMIN — DIAZEPAM 5 MG: 5 TABLET ORAL at 09:37

## 2024-11-14 RX ADMIN — GABAPENTIN 100 MG: 100 CAPSULE ORAL at 09:27

## 2024-11-14 RX ADMIN — RIFAXIMIN 550 MG: 550 TABLET ORAL at 20:11

## 2024-11-14 RX ADMIN — METHADONE HYDROCHLORIDE 40 MG: 10 TABLET ORAL at 20:15

## 2024-11-14 RX ADMIN — METHADONE HYDROCHLORIDE 40 MG: 10 TABLET ORAL at 06:38

## 2024-11-14 RX ADMIN — SENNOSIDES AND DOCUSATE SODIUM 1 TABLET: 50; 8.6 TABLET ORAL at 09:27

## 2024-11-14 RX ADMIN — HYDROMORPHONE HYDROCHLORIDE 0.5 MG: 1 INJECTION, SOLUTION INTRAMUSCULAR; INTRAVENOUS; SUBCUTANEOUS at 06:45

## 2024-11-14 RX ADMIN — SODIUM CHLORIDE, PRESERVATIVE FREE 10 ML: 5 INJECTION INTRAVENOUS at 09:29

## 2024-11-14 RX ADMIN — RIFAXIMIN 550 MG: 550 TABLET ORAL at 09:27

## 2024-11-14 RX ADMIN — OXYCODONE 10 MG: 5 TABLET ORAL at 20:11

## 2024-11-14 RX ADMIN — OXYCODONE 10 MG: 5 TABLET ORAL at 14:24

## 2024-11-14 RX ADMIN — SENNOSIDES AND DOCUSATE SODIUM 1 TABLET: 50; 8.6 TABLET ORAL at 20:11

## 2024-11-14 RX ADMIN — METHOCARBAMOL TABLETS 750 MG: 750 TABLET, COATED ORAL at 09:27

## 2024-11-14 ASSESSMENT — PAIN DESCRIPTION - ONSET
ONSET: ON-GOING

## 2024-11-14 ASSESSMENT — PAIN DESCRIPTION - FREQUENCY
FREQUENCY: INTERMITTENT
FREQUENCY: CONTINUOUS
FREQUENCY: CONTINUOUS

## 2024-11-14 ASSESSMENT — PAIN DESCRIPTION - LOCATION
LOCATION: NECK;ARM
LOCATION: SHOULDER
LOCATION: NECK;ARM

## 2024-11-14 ASSESSMENT — PAIN - FUNCTIONAL ASSESSMENT
PAIN_FUNCTIONAL_ASSESSMENT: ACTIVITIES ARE NOT PREVENTED

## 2024-11-14 ASSESSMENT — PAIN DESCRIPTION - DESCRIPTORS
DESCRIPTORS: ACHING
DESCRIPTORS: ACHING;THROBBING
DESCRIPTORS: ACHING;THROBBING

## 2024-11-14 ASSESSMENT — PAIN DESCRIPTION - ORIENTATION
ORIENTATION: LEFT
ORIENTATION: RIGHT
ORIENTATION: RIGHT

## 2024-11-14 ASSESSMENT — PAIN DESCRIPTION - PAIN TYPE
TYPE: CHRONIC PAIN
TYPE: ACUTE PAIN
TYPE: CHRONIC PAIN

## 2024-11-14 ASSESSMENT — PAIN SCALES - GENERAL
PAINLEVEL_OUTOF10: 5
PAINLEVEL_OUTOF10: 8
PAINLEVEL_OUTOF10: 8
PAINLEVEL_OUTOF10: 7
PAINLEVEL_OUTOF10: 0
PAINLEVEL_OUTOF10: 6
PAINLEVEL_OUTOF10: 7
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
## 2024-11-14 NOTE — PROGRESS NOTES
\"LABCAST\", \"BACTERIA\", \"COMU\", \"HYALCAST\", \"WBCUA\", \"RBCUA\" in the last 72 hours.    Invalid input(s): \"EPIU\"  Urine Culture: No results for input(s): \"LABURIN\" in the last 72 hours.  Urine Chemistry: No results for input(s): \"CLUR\", \"LABCREA\", \"PROTEINUR\", \"NAUR\" in the last 72 hours.      IMAGING:  US RENAL COMPLETE   Final Result   1. Magnetic resonance imaging without and with contrast is recommended (at the   minimum, noncontrast MRI) for further evaluation   2. Multiple renal cysts with no evidence of hydronephrosis            Electronically signed by Derian Huerta      US ABDOMEN LIMITED Specify organ? LIVER, SPLEEN   Final Result   IMPRESSION :      Splenomegaly.         Electronically signed by Percy Raines          Medical Decision Making:  The following items were considered in medical decision making:  Discussion of patient care with other providers  Reviewed clinical lab tests  Reviewed radiology tests  Reviewed other diagnostic tests/interventions    Donna Mejía PA-C   [facility]  Office : [phone] or through [employer]  Fax :[phone]    I have seen the patient independently from the PA/NP .I discussed the care with the PA/NP . I personally reviewed the HPI, PH, FH, SH, ROS and medications. I repeated pertinent portions of the examination and reviewed the relevant imaging and laboratory data. I agree with the findings, assessment and plan as documented, with the following addendum:        Jhonatan Noble MD

## 2024-11-14 NOTE — CARE COORDINATION
Case Management Assessment            Discharge Note                    Date / Time of Note: 11/14/2024 4:28 PM                  Discharge Note Completed by: Alicia Corley RN    Patient Name: Barrera Carbone   YOB: 1950  Diagnosis: Cervical myelopathy (HCC) [G95.9]   Date / Time: 11/5/2024  3:17 PM    Current PCP: No primary care provider on file.  Clinic patient: No    Hospitalization in the last 30 days: No       Advance Directives:  Code Status: Full Code  [place] DNR form completed and on chart: Not Indicated    Financial:  Payor: [employer] / Plan: [employer] OPTUM / Product Type: *No Product type* /      Pharmacy:    [id number] - [place], [place] - [address] P [phone] - F [phone]  [address] [address]  Phone: [phone] Fax: [phone]    [facility] - [facility] [address] [phone] - F [phone]  [address] [address]  Phone: [phone] Fax: [phone]      Assistance purchasing medications?: Potential Assistance Purchasing Medications: No  Assistance provided by Case Management: None at this time    Does patient want to participate in local refill/ meds to beds program?: Yes    Meds To Beds General Rules:  1. Can ONLY be done Monday- Friday between 8:30am-5pm  2. Prescription(s) must be in pharmacy by 3pm to be filled same day  3.Copy of patient's insurance/ prescription drug card and patient face sheet must be sent along with the prescription(s)  4. Cost of Rx cannot be added to hospital bill. If financial assistance is needed, please contact unit  or ;  or  CANNOT provide pharmacy voucher for patients co-pays  5. Patients can then  the prescription on their way out of the hospital at discharge, or pharmacy can deliver to the bedside if staff is available. (payment due at time of pick-up or delivery  - cash, check, or card accepted)     Able to afford home medications/ co-pay costs: Yes    ADLS:  Current PT AM-PAC Score: 18 /24  Current OT AM-PAC Score: 17 /24    DISCHARGE Disposition: Home- No Services Needed    LOC at discharge: Not Applicable  NICHOLAS Completed: Not Indicated    Notification completed in HENS/PAS?:  Not Applicable    IMM Completed:   Not Indicated due to: [facility]          Transportation:  Transportation PLAN for discharge:  tbd, refused CM's help     Mode of Transport: Private Car  Reason for medical transport: Not Applicable  Name of Transport Company: Not Applicable  Time of Transport: tbd    Transport form completed: Not Indicated    Home Care:  Home Care ordered at discharge: Not Indicated  Home Care Agency: Not Applicable  Orders faxed: No    Durable Medical Equipment:  DME Provider: n/a  Equipment obtained during hospitalization: n/a          Additional CM Notes: CM spoke with patient at bedside.  Patient states that his wife is not answering his calls.  CM offered to call pt's wife to come get him and he declined.  Patient states he did not think they were kicking him out tonight.  CM explained that the discharge order is in and his nurse will go over discharge instructions.  Patient states he can call a cab.  CM offered to set up a lyft if we know that his wife is there to let him in.  Patient declined any help from CM.  CM d/w JUAN Botello.      COVID Result:  No results found for: \"COVID19\"    The Plan for Transition of Care is related to the following treatment goals of Cervical myelopathy (HCC) [G95.9]    The Patient and/or patient representative Barrera and his family were provided with a choice of provider and agrees with the discharge plan Yes    Freedom of choice list was provided with basic dialogue that supports the patient's individualized plan of care/goals and shares the quality data associated with the providers. Yes    Care Transitions patient: No    Alicia Corley, RN  [facility]

## 2024-11-14 NOTE — PROGRESS NOTES
Comprehensive Nutrition Assessment    RECOMMENDATIONS:  PO Diet: Regular; low microbial  Nutrition Supplement: Available PRN  Nutrition Education: Education/Counseling not indicated     NUTRITION ASSESSMENT:   Nutritional summary & status: Assessing patient for length of stay. Admitted for cervical decompression. Neurosurgery evaluating ability to perform C3-6 decompression pending correction of hematologic abnormalities. Insignificant 8% loss in 8 months. PO intake adequate while inpatient, 3 meals/day on low K diet, >75%. No nutrition interventions at this time. WIll monitor plan of care.     Admission // PMH: Cervical Myelopathy //cirrhosis secondary to hepatitis C, COPD, CHF, pancytopenia    MALNUTRITION ASSESSMENT  Context of Malnutrition: Acute Illness   Malnutrition Status: No malnutrition  Findings of the 6 clinical characteristics of malnutrition (Minimum of 2 out of 6 clinical characteristics is required to make the diagnosis of moderate or severe Protein Calorie Malnutrition based on AND/ASPEN Guidelines):  Energy Intake:  No decrease in energy intake  Weight Loss:  No weight loss     Body Fat Loss:  No body fat loss     Muscle Mass Loss:  No muscle mass loss        NUTRITION DIAGNOSIS   No nutrition diagnosis at this time     Nutrition Monitoring and Evaluation:   Food/Nutrient Intake Outcomes:  Food and Nutrient Intake  Physical Signs/Symptoms Outcomes:  Biochemical Data, Nutrition Focused Physical Findings, Skin, Weight     OBJECTIVE DATA: Significant to nutrition assessment  Nutrition Related Findings: +6 L, labs reviewed,  11/13  Wounds: None  Nutrition Goals: Meet at least 75% of estimated needs, by next RD assessment     CURRENT NUTRITION THERAPIES  ADULT DIET; Regular; Low Potassium (Less than 3000 mg/day)  PO Intake: %, 51-75%   PO Supplement Intake:None Ordered  Additional Sources of Calories/IVF:NA     COMPARATIVE STANDARDS  Energy (kcal):  2834-7024 (25-28 kcal/kg CBW)     Protein

## 2024-11-14 NOTE — DISCHARGE SUMMARY
Discharge Summary    Date of Admission: 11/5/2024  3:17 PM  Date of Discharge: 11/14/2024  Admission Diagnosis: Cervical myelopathy (HCC)  Discharge Diagnosis: Same   Condition on Discharge: fair  Attending for Admission: Colten Prater MD  Procedures: None  Consults: IP CONSULT TO INTERNAL MEDICINE  IP CONSULT TO ONCOLOGY  IP CONSULT TO HEM/ONC  IP CONSULT TO PHARMACY  IP CONSULT TO SPIRITUAL SERVICES  IP CONSULT TO CARDIOLOGY    Reason for Admission:  Barrera Carbone is a 74 y.o. male patient who was admitted to the hospital for complaints of imbalance and difficulty with fine motor movements. He was unable to undergo the CERVICAL 3-CERVICAL 6 POSTERIOR CERVICAL DECOMPRESSION FUSION AND FIXATION due to extremely low WBC, ANC, and Platelet counts. Patient had known pancytopenia and was previously at platelets 60-70k and WBC of 3.0. [facility] but worse numbers on day of admission. Dr. Prater discussed with Dr. Cheatham ([facility] Hematologist) who does not believe significant progress can be made to correct the pancytopenia. He may ultimately not be a candidate for surgery as his platelets cannot be elevated and his ANC is less than 1. Surgery would pose significant risk of hemorrhage and infection. Moreover, patient has poor EF, hep C, and cirrhosis, and splenomegaly which further compound the issue. Patient was discharged home with follow up in the near future with Dr. Prater after he has an opportunity to discuss the case further with [facility].    Hospitalist was managing patient's medical issues:    Pancytopenia:  Patient has a allergy to interferon so therefore cannot start him on G-CSF  Patient was started on dexamethasone 40 mg daily, but that did not keep numbers up long enough for surgery  [facility]     Thrombocytopenia:  As noted by oncology/hematology likely secondary to peripheral sequestration in addition by splenomegaly versus ITP.  Appears  known as: COREG     diclofenac sodium 1 % Gel  Commonly known as: VOLTAREN  Apply 4 g topically 4 times daily     docusate 100 MG Caps  Commonly known as: COLACE, DULCOLAX     ferrous sulfate 325 (65 Fe) MG tablet  Commonly known as: IRON 325     lactulose 10 g packet  Commonly known as: CEPHULAC     methadone 40 MG disintegrating tablet  Commonly known as: METHADOSE     omeprazole 20 MG delayed release capsule  Commonly known as: PRILOSEC     tiotropium-olodaterol 2.5-2.5 MCG/ACT Aers  Commonly known as: STIOLTO            STOP taking these medications      allopurinol 100 MG tablet  Commonly known as: ZYLOPRIM     atorvastatin 80 MG tablet  Commonly known as: LIPITOR     hydrALAZINE 25 MG tablet  Commonly known as: APRESOLINE     hydrOXYzine HCl 10 MG tablet  Commonly known as: ATARAX     metoprolol succinate 50 MG extended release tablet  Commonly known as: TOPROL XL               Where to Get Your Medications        These medications were sent to [id number] [place], [place] - [address] - P [phone] - F [phone]  [facility] [address] [facility] [address]      Phone: [phone]   oxyCODONE-acetaminophen 5-325 MG per tablet         Discharge Destination:  The patient was discharged to Home.     Follow-up:  The patient is to follow-up with Colten Prater MD in the office in 2 weeks      Discharge Instructions:   Verbal and written discharge instructions were given to the patient at the time of discharge.    Electronically signed by: CHARISSA Aguirre CNP, APRN-CNP, 11/14/2024 4:12 PM  [phone]    I spent 60 minutes in the care of this patient.  Over 50% of that time was in face-to-face counseling regarding disease process, diagnostic testing, preventative measures, and answering patient and family questions

## 2024-11-14 NOTE — PLAN OF CARE
Problem: Chronic Conditions and Co-morbidities  Goal: Patient's chronic conditions and co-morbidity symptoms are monitored and maintained or improved  Outcome: Progressing  Flowsheets (Taken 11/13/2024 1600 by Glo Staples RN)  Care Plan - Patient's Chronic Conditions and Co-Morbidity Symptoms are Monitored and Maintained or Improved: Monitor and assess patient's chronic conditions and comorbid symptoms for stability, deterioration, or improvement     Problem: Discharge Planning  Goal: Discharge to home or other facility with appropriate resources  Outcome: Progressing  Flowsheets (Taken 11/13/2024 1600 by Glo Staples RN)  Discharge to home or other facility with appropriate resources: Identify barriers to discharge with patient and caregiver     Problem: Pain  Goal: Verbalizes/displays adequate comfort level or baseline comfort level  Outcome: Progressing  Flowsheets (Taken 11/13/2024 1600 by Violeta Lindquist RN)  Verbalizes/displays adequate comfort level or baseline comfort level: Encourage patient to monitor pain and request assistance     Problem: Safety - Adult  Goal: Free from fall injury  Outcome: Progressing     Problem: ABCDS Injury Assessment  Goal: Absence of physical injury  Outcome: Progressing     Problem: Confusion  Goal: Confusion, delirium, dementia, or psychosis is improved or at baseline  Description: INTERVENTIONS:  1. Assess for possible contributors to thought disturbance, including medications, impaired vision or hearing, underlying metabolic abnormalities, dehydration, psychiatric diagnoses, and notify attending LIP  2. Cobb high risk fall precautions, as indicated  3. Provide frequent short contacts to provide reality reorientation, refocusing and direction  4. Decrease environmental stimuli, including noise as appropriate  5. Monitor and intervene to maintain adequate nutrition, hydration, elimination, sleep and activity  6. If unable to ensure safety without constant
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
## 2024-11-14 NOTE — PROGRESS NOTES
Neurosurgery Progress Note    Patient seen and examined on 11/13/24. No acute events overnight. Neurologically stable on exam. WBC and ANC remain depressed as do platelets.     A/P: 75 yo man with cervical myelopathy but with thrombocytopenia and neutropenia.     -Neuro stable  -Hematology following working on pancytopenia. Discussed with Dr. Cheatham who does not believe significant progress can be made to correct the pancytopenia. He may ultimately not be a candidate for surgery as his platelets cannot be elevated and his ANC is less than 1. Surgery would pose significant risk of hemorrhage and infection. Moreover, patient has poor EF, hep C, and cirrhosis, and splenomegaly which further compound the issue.   -Hospitalist following  -Will discuss issues with patient further. Unlikely to tolerate an operation. May have to DC and continue workup as outpatient.     Colten Prater MD, PhD  [address], [address]  [place], [place], [address] [phone] (c), [phone] (o)
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
## 2024-11-14 NOTE — PROGRESS NOTES
98.3 °F (36.8 °C)  98.2 °F (36.8 °C)   TempSrc: Oral Oral  Oral   SpO2: 92% 93%  94%   Weight:       Height:             Physical Exam:      General: Unkempt  Eyes: Clear nonicteric   ENT: neck supple trach midline  Cardiovascular: Irregular rate no murmur  Respiratory: Clear to auscultation  Gastrointestinal: Soft depressible  BS Positive  Genitourinary: no suprapubic tenderness  Musculoskeletal:  edema none  Skin: warm, dry  Neuro: A & O: X 3  Psych: Mood appropriate.         Medications:   Medications:    rifAXIMin  550 mg Oral BID    carvedilol  25 mg Oral BID WC    lactulose  20 g Oral BID    insulin lispro  0-4 Units SubCUTAneous 4x Daily AC & HS    pantoprazole  40 mg Oral QAM AC    gabapentin  100 mg Oral TID    methocarbamol  750 mg Oral Q6H    sodium chloride flush  5-40 mL IntraVENous 2 times per day    sennosides-docusate sodium  1 tablet Oral BID    polyethylene glycol  17 g Oral Daily    methadone  40 mg Oral Q12H      Infusions:    dextrose      sodium chloride      sodium chloride       PRN Meds: glucose, 4 tablet, PRN  dextrose bolus, 125 mL, PRN   Or  dextrose bolus, 250 mL, PRN  glucagon (rDNA), 1 mg, PRN  dextrose, , Continuous PRN  hydrALAZINE, 10 mg, Q8H PRN  sodium chloride, , PRN  sodium chloride, , PRN  oxyCODONE, 10 mg, Q4H PRN  HYDROmorphone, 0.5 mg, Q3H PRN  sodium chloride flush, 5-40 mL, PRN  ondansetron, 4 mg, Q8H PRN   Or  ondansetron, 4 mg, Q6H PRN  bisacodyl, 10 mg, Daily PRN  diazePAM, 5 mg, Q6H PRN        Labs and Imaging   US ABDOMEN LIMITED Specify organ? LIVER, SPLEEN    Result Date: 11/6/2024  ULTRASOUND ABDOMEN LIMITED History : Pancytopenia Comparison : March 12, 2024 COMMENTS : Liver : The liver is mildly enlarged measuring up to 18.7 cm in length. There is no focal hepatic lesion. The portal and hepatic veins are patent. Gallbladder and Bile ducts : The gallbladder is surgically absent. The extrahepatic common bile duct measures 7 mm. Pancreas : Normal Spleen: The spleen

## 2024-11-14 NOTE — PROGRESS NOTES
[facility] Cardiology Progress Note    Primary Cardiologist: Dr Matta    CC/HPI: AF, NICM    S: No chest pain or sob    Tele: AF HR 88    O:  Physical Exam:  BP (!) 176/72   Pulse 89   Temp 98.3 °F (36.8 °C) (Oral)   Resp 16   Ht 1.854 m (6' 1\")   Wt 84.8 kg (187 lb)   SpO2 93%   BMI 24.67 kg/m²    General (appearance):  No acute distress  Eyes: anicteric   Neck: soft, No JVD  Ears/Nose/Mouth/Thorat: No cyanosis  CV: Irreg, irreg   Respiratory:  clear, normal effort  GI: soft, non-tender, non-distended  Skin: Warm, dry. No rashes  Neuro/Psych: Alert and oriented x 3. Appropriate behavior  Ext:  No c/c.     I.O's=     Weight  Admission: Weight - Scale: 85.3 kg (188 lb)   Today: Weight - Scale: 84.8 kg (187 lb)    CBC:   Recent Labs     24  0005 24  0754 24  0649   WBC 1.2* 1.7* 1.8*   HGB 10.6* 10.8* 10.8*   HCT 34.1* 35.0* 34.7*   MCV 87.6 87.5 88.7   PLT 60* 90* 81*     BMP:   Recent Labs     24  0549 24  0754 24  0649    141 139   K 5.3* 5.8* 5.0    109 108   CO2 23 24 21   PHOS 4.3 3.9 3.1   BUN 73* 66* 59*   CREATININE 2.9* 2.6* 2.3*     Estimated Creatinine Clearance: 32 mL/min (A) (based on SCr of 2.3 mg/dL (H)).  Mag: No results found for: \"MG\"  LIVER PROFILE: No results for input(s): \"AST\", \"ALT\", \"LIPASE\", \"AMYLASE\", \"BILIDIR\", \"BILITOT\", \"ALKPHOS\" in the last 72 hours.    Invalid input(s): \"ALB\"  Pro-BNP:   Lab Results   Component Value Date/Time    PROBNP 2,513 2024 07:54 AM    PROBNP 3,154 2024 07:31 AM     High Sensitivity Troponin:   No results found for: \"CKTOTAL\", \"CKMB\", \"CKMBINDEX\", \"TROPONINI\", \"TROPHS\"    Imagin2024 Echo    Left Ventricle: Moderately reduced left ventricular systolic function with a visually estimated EF of 35 - 40%. Left ventricle is dilated. Normal wall thickness. Global hypokinesis present.    Right Ventricle: Normal systolic function.    Aortic Valve: Trileaflet valve.  Mild to moderate regurgitation.    Mitral Valve: Trace regurgitation.    Tricuspid Valve: Trace regurgitation.    Left Atrium: Left atrium is severely dilated. Left atrial volume index is severely increased (>48 mL/m2).    Right Atrium: Right atrium is dilated.    Aorta: Dilated aortic root. Ao root diameter is 4.5 cm. Dilated ascending aorta. Ao ascending diameter is 4.3 cm.    Image quality is adequate. Contrast used: Definity.      Assessment:  Atrial fibrillation   HFrEF  Dilated aortic root and ascending aorta   Thrombocytopenia   CKD- hx partial nephrectomy d/t RCC  DM  HTN  Cervical myelopathy      Plan:  -Keep K>4, Mg>2.  -Coreg 25 mg po bid   -No AC d/t thrombocytopenia  -No ACE/ARNI, MRA or SGLT2 d/t CKD and hyperkalemia   -appears euvolemic   -Starting hydralazine 10 mg IV q 8 hours PRN SBP > 160      Please call the nurse navigator at [phone] during the weekdays  Please call the office at [phone] after hours and weekends.

## 2024-11-15 VITALS
SYSTOLIC BLOOD PRESSURE: 158 MMHG | HEIGHT: 73 IN | BODY MASS INDEX: 24.92 KG/M2 | TEMPERATURE: 98 F | HEART RATE: 95 BPM | DIASTOLIC BLOOD PRESSURE: 83 MMHG | RESPIRATION RATE: 16 BRPM | OXYGEN SATURATION: 95 % | WEIGHT: 188 LBS

## 2024-11-15 LAB
ALBUMIN SERPL-MCNC: 4.1 G/DL (ref 3.4–5)
ANION GAP SERPL CALCULATED.3IONS-SCNC: 9 MMOL/L (ref 3–16)
BUN SERPL-MCNC: 51 MG/DL (ref 7–20)
CALCIUM SERPL-MCNC: 8.7 MG/DL (ref 8.3–10.6)
CHLORIDE SERPL-SCNC: 107 MMOL/L (ref 99–110)
CO2 SERPL-SCNC: 25 MMOL/L (ref 21–32)
CREAT SERPL-MCNC: 2.2 MG/DL (ref 0.8–1.3)
GFR SERPLBLD CREATININE-BSD FMLA CKD-EPI: 31 ML/MIN/{1.73_M2}
GLUCOSE BLD-MCNC: 101 MG/DL (ref 70–99)
GLUCOSE SERPL-MCNC: 93 MG/DL (ref 70–99)
PERFORMED ON: ABNORMAL
PHOSPHATE SERPL-MCNC: 3.5 MG/DL (ref 2.5–4.9)
POTASSIUM SERPL-SCNC: 4.5 MMOL/L (ref 3.5–5.1)
SODIUM SERPL-SCNC: 141 MMOL/L (ref 136–145)

## 2024-11-15 PROCEDURE — 6370000000 HC RX 637 (ALT 250 FOR IP): Performed by: INTERNAL MEDICINE

## 2024-11-15 PROCEDURE — 6370000000 HC RX 637 (ALT 250 FOR IP): Performed by: NURSE PRACTITIONER

## 2024-11-15 PROCEDURE — 36415 COLL VENOUS BLD VENIPUNCTURE: CPT

## 2024-11-15 PROCEDURE — 80069 RENAL FUNCTION PANEL: CPT

## 2024-11-15 RX ADMIN — RIFAXIMIN 550 MG: 550 TABLET ORAL at 09:05

## 2024-11-15 RX ADMIN — SENNOSIDES AND DOCUSATE SODIUM 1 TABLET: 50; 8.6 TABLET ORAL at 09:05

## 2024-11-15 RX ADMIN — OXYCODONE 10 MG: 5 TABLET ORAL at 12:21

## 2024-11-15 RX ADMIN — OXYCODONE 10 MG: 5 TABLET ORAL at 04:57

## 2024-11-15 RX ADMIN — GABAPENTIN 100 MG: 100 CAPSULE ORAL at 09:05

## 2024-11-15 RX ADMIN — METHADONE HYDROCHLORIDE 40 MG: 10 TABLET ORAL at 06:19

## 2024-11-15 RX ADMIN — LACTULOSE 20 G: 20 SOLUTION ORAL at 09:05

## 2024-11-15 RX ADMIN — POLYETHYLENE GLYCOL 3350 17 G: 17 POWDER, FOR SOLUTION ORAL at 09:05

## 2024-11-15 RX ADMIN — CARVEDILOL 25 MG: 25 TABLET, FILM COATED ORAL at 09:05

## 2024-11-15 ASSESSMENT — PAIN SCALES - GENERAL
PAINLEVEL_OUTOF10: 8
PAINLEVEL_OUTOF10: 6
PAINLEVEL_OUTOF10: 6
PAINLEVEL_OUTOF10: 5

## 2024-11-15 ASSESSMENT — PAIN DESCRIPTION - ONSET
ONSET: ON-GOING
ONSET: ON-GOING

## 2024-11-15 ASSESSMENT — PAIN - FUNCTIONAL ASSESSMENT
PAIN_FUNCTIONAL_ASSESSMENT: ACTIVITIES ARE NOT PREVENTED
PAIN_FUNCTIONAL_ASSESSMENT: ACTIVITIES ARE NOT PREVENTED

## 2024-11-15 ASSESSMENT — PAIN DESCRIPTION - DESCRIPTORS
DESCRIPTORS: ACHING
DESCRIPTORS: ACHING

## 2024-11-15 ASSESSMENT — PAIN DESCRIPTION - PAIN TYPE
TYPE: ACUTE PAIN;CHRONIC PAIN
TYPE: ACUTE PAIN;CHRONIC PAIN

## 2024-11-15 ASSESSMENT — PAIN DESCRIPTION - ORIENTATION
ORIENTATION: RIGHT;LEFT
ORIENTATION: RIGHT;LEFT

## 2024-11-15 ASSESSMENT — PAIN DESCRIPTION - LOCATION
LOCATION: HEAD;HAND;LEG
LOCATION: GENERALIZED

## 2024-11-15 ASSESSMENT — PAIN DESCRIPTION - FREQUENCY
FREQUENCY: CONTINUOUS
FREQUENCY: CONTINUOUS

## 2024-11-15 NOTE — PLAN OF CARE
Problem: Pain  Goal: Verbalizes/displays adequate comfort level or baseline comfort level  11/15/2024 0219 by Karoline Martinez, RN  Outcome: Progressing   Medicated pt per orders, please see e-Mar. Encourage pt to call if pain is unrelieved. Will continue to monitor.    Problem: Safety - Adult  Goal: Free from fall injury  11/15/2024 0219 by Karoline Martinez, RN  Outcome: Progressing   Pt remains free from injury during this is shifts. Pt is up with assist x 1 person. Encourage pt to call for all assistance. Call light is in reach, bed alarm activated for safety, bed locked and in lowest position. Will continue to monitor

## 2024-11-15 NOTE — PROGRESS NOTES
All discharge instructions reviewed with patient. All questions and concerns addressed at this time. IV removed. Patient belongings gathered. Patient taken downstairs via wheelchair to the lobby to be picked up for discharge.    Electronically signed by Kerrie Greer RN on 11/15/2024 at 2:43 PM

## 2024-11-15 NOTE — PLAN OF CARE
Problem: Safety - Adult  Goal: Free from fall injury  11/15/2024 0811 by Kerrie Greer, RN  Outcome: Progressing   All fall precautions in place. Bed locked and in lowest position with alarm on. Overbed table and personal belonings within reach. Call light within reach and patient instructed to use call light for assistance. Non-skid socks on.    Problem: Pain  Goal: Verbalizes/displays adequate comfort level or baseline comfort level  11/15/2024 0811 by Kerrie Greer, RN  Outcome: Progressing   Pt complains of generalized pain. Being treated with PRN pain medication, rest, and frequent repositioning with pillow support for comfort and pressure relief. Pt reports some relief from pain with above interventions.

## 2024-11-15 NOTE — PROGRESS NOTES
Neurosurgery Progress Note    Patient seen and examined on 11/13/24. No acute events overnight. Neurologically stable on exam. WBC (1.8) and ANC (1.2) remain depressed as do platelets (81 after transfusion yesterday). Hb 10.8. Patient upset his numbers are not improving. Notes he would rather risk a hematoma or infection than become plegic.     A/P: 73 yo man with cervical myelopathy but with thrombocytopenia and neutropenia.     -Neuro stable  -Hematology following working on pancytopenia. Discussed with Dr. Cheatham who does not believe significant progress can be made to correct the pancytopenia.   -Hospitalist following  -I again discussed the matter with the patient, including his very high risk of complication in the setting of CHF (EF 25-30%), pancytopenia (WBC 1k, ANC ~ 1, thrombocytopenia, and anemia), cirrhosis  2/2 hep C, splenomegaly, CKD (Cr 2.4), etc. At this point, I suggested the patient be discharged home and return to his own hematologist to maximize the potential to proceed with surgery. Patient noted he would rather \"be dead than paralyzed\" but in the setting of his extreme medical risk it is not prudent to proceed. I discussed his case with several of my neurosurgery colleagues who concur that surgical intervention should not proceed in the current setting. Will have patient follow up in office with his wife to reinforce the risks and further discuss his complex situation.       Colten Prater MD, PhD  [address], [address]  [place], [address] [phone] (c), [phone] (o)

## 2024-11-15 NOTE — PLAN OF CARE
Problem: Chronic Conditions and Co-morbidities  Goal: Patient's chronic conditions and co-morbidity symptoms are monitored and maintained or improved  Outcome: Progressing     Problem: Discharge Planning  Goal: Discharge to home or other facility with appropriate resources  Outcome: Progressing     Problem: Pain  Goal: Verbalizes/displays adequate comfort level or baseline comfort level  Outcome: Progressing   Pt pain being managed per MAR with PRN and scheduled medications; along with non pharm interventions such as intermittent ice packs.     Problem: Safety - Adult  Goal: Free from fall injury  Outcome: Progressing   All safety precautions are in place; bed in lowest position, wheels locked, bed/chair alarm on, call light and personal belongings within reach.     Problem: ABCDS Injury Assessment  Goal: Absence of physical injury  Outcome: Progressing

## 2024-11-15 NOTE — PROGRESS NOTES
Pt is alert and oriented. VSS. Pt much more pleasant. Updated pt wife. All safety measures in place. Will continue to monitor.

## 2024-11-15 NOTE — DISCHARGE INSTRUCTIONS
Extra Heart Failure Education/ Tools/ Resources:     https://[employer].com/publication/?c=145158   --- this is American Heart Association interactive Healthier Living with Heart Failure guidebook.  Please click hyperlink or copy / paste link into search bar. The QR Code is also available below. Use your mouse to scroll through the pages.  Lots of information about weight monitoring, diet tips, activity, meds, etc    Heart Failure Tools and Resources QR Code is below. It includes multiple resources to include symptom tracker, med tracker, further HF info, and access to a HF Support Network online Community    HF [place] [employer]  -- this is a free smart phone cisco available for iPhone and Android download.  Use your phone to track sodium / fluid intake, zone tool symptom tracking, weights, medications, etc. Click on this hyperlink  HF [place] [employer]   for QR code for easy download or the link is also found in the below HF Tools and Resources.      DASH (Dietary Approach to Stop Hypertension) diet --  https://www.nhlbi.nih.gov/education/dash-eating-plan -- this diet is a flexible eating plan that promotes heart healthy eating style.  Click on hyperlink or copy / paste link into search bar.  Lots of low sodium recipes and tips.    https://www.Cooking.com.Umami/recipes  -- more free recipes

## 2024-11-15 NOTE — CARE COORDINATION
Patient's wife Cyndi called this morning around 0900.  Cyndi states she and her daughter would be visiting patient later this afternoon.  CM d/w Cyndi possible home care.  Cyndi states that patient did have home PT, but dismissed them.  She does not feel he would want home care.  YAO explained that pt was discharged yesterday and that is ready to discharge home.      Alicia Corley RN, BSN,    Ortho/Neuro   [phone]

## 2024-11-15 NOTE — PROGRESS NOTES
[facility] Cardiology Progress Note    Primary Cardiologist: Dr Matta    CC/HPI: AF, NICM    S: Denies chest pain or sob    Tele: AF HR 88    O:  Physical Exam:  /85   Pulse 79   Temp 98.2 °F (36.8 °C) (Oral)   Resp 16   Ht 1.854 m (6' 1\")   Wt 84.8 kg (187 lb)   SpO2 94%   BMI 24.67 kg/m²    General (appearance):  No acute distress  Eyes: anicteric   Neck: soft, No JVD  Ears/Nose/Mouth/Thorat: No cyanosis  CV: Irreg, irreg   Respiratory:  clear, normal effort  GI: soft, non-tender, non-distended  Skin: Warm, dry. No rashes  Neuro/Psych: Alert and oriented x 3. Appropriate behavior  Ext:  No c/c.     I.O's=     Weight  Admission: Weight - Scale: 85.3 kg (188 lb)   Today: Weight - Scale: 84.8 kg (187 lb)    CBC:   Recent Labs     24  0005 24  0754 24  0649   WBC 1.2* 1.7* 1.8*   HGB 10.6* 10.8* 10.8*   HCT 34.1* 35.0* 34.7*   MCV 87.6 87.5 88.7   PLT 60* 90* 81*     BMP:   Recent Labs     24  0754 24  0649    139   K 5.8* 5.0    108   CO2 24 21   PHOS 3.9 3.1   BUN 66* 59*   CREATININE 2.6* 2.3*     Estimated Creatinine Clearance: 32 mL/min (A) (based on SCr of 2.3 mg/dL (H)).  Mag: No results found for: \"MG\"  LIVER PROFILE: No results for input(s): \"AST\", \"ALT\", \"LIPASE\", \"AMYLASE\", \"BILIDIR\", \"BILITOT\", \"ALKPHOS\" in the last 72 hours.    Invalid input(s): \"ALB\"  Pro-BNP:   Lab Results   Component Value Date/Time    PROBNP 2,513 2024 07:54 AM    PROBNP 3,154 2024 07:31 AM     High Sensitivity Troponin:   No results found for: \"CKTOTAL\", \"CKMB\", \"CKMBINDEX\", \"TROPONINI\", \"TROPHS\"    Imagin2024 Echo    Left Ventricle: Moderately reduced left ventricular systolic function with a visually estimated EF of 35 - 40%. Left ventricle is dilated. Normal wall thickness. Global hypokinesis present.    Right Ventricle: Normal systolic function.    Aortic Valve: Trileaflet valve. Mild to moderate regurgitation.    Mitral

## 2024-11-15 NOTE — PROGRESS NOTES
S: Patient seen and evaluated. Sitting in bed.     O:   Vitals:    11/15/24 0845 11/15/24 1215 11/15/24 1221 11/15/24 1251   BP: 137/85 (!) 158/83     Pulse: 79 95     Resp: 16 16 16 16   Temp: 98.2 °F (36.8 °C) 98 °F (36.7 °C)     TempSrc: Oral Oral     SpO2: 94% 95%     Weight:  85.3 kg (188 lb)     Height:         A/P: 74-year-old male presenting for elective C-spine surgery but unable to be performed secondary to pancytopenia in the setting of known liver cirrhosis.  Already discharged.  Paroxysmal A-fib  Thrombocytopenia  Pancytopenia  Cervical spine DDD  CHF  COPD  Type II DM

## (undated) DEVICE — SUTURE MONOCRYL + SZ 4-0 L27IN ABSRB UD L19MM PS-2 3/8 CIR MCP426H

## (undated) DEVICE — DRAPE MICSCP W54XL150IN W/ 4 BINOC GLS LENS LEICA

## (undated) DEVICE — CONTAINER,SPECIMEN,PNEU TUBE,3OZ,OR STRL: Brand: MEDLINE

## (undated) DEVICE — TOWEL,STOP FLAG GOLD N-W: Brand: MEDLINE

## (undated) DEVICE — NEPTUNE E-SEP SMOKE EVACUATION PENCIL, COATED, 70MM BLADE, PUSH BUTTON SWITCH: Brand: NEPTUNE E-SEP

## (undated) DEVICE — SUTURE VICRYL + SZ 3-0 L18IN ABSRB UD SH 1/2 CIR TAPERCUT NDL VCP864D

## (undated) DEVICE — STAPLER SKIN H3.9MM WIRE DIA0.58MM CRWN 6.9MM 35 STPL ROT

## (undated) DEVICE — Z DISCONTINUED USE 2218132 SUTURE ETHILON SZ 3-0 L30IN NONABSORBABLE BLK L36MM FSLX 3/8 1673BH

## (undated) DEVICE — UNDERGLOVE SURG SZ 8 BLU LTX FREE SYN POLYISOPRENE POLYMER

## (undated) DEVICE — SPONGE GZ W4XL4IN COT 12 PLY TYP VII WVN C FLD DSGN STERILE

## (undated) DEVICE — 3M™ IOBAN™ 2 ANTIMICROBIAL INCISE DRAPE 6640EZ: Brand: IOBAN™ 2

## (undated) DEVICE — BLANKET WRM W40.2XL55.9IN IORT LO BODY + MISTRAL AIR

## (undated) DEVICE — 3M™ DURAPORE™ SURGICAL TAPE 1538-3, 3 INCH X 10 YARD (7,5CM X 9,1M), 4 ROLLS/BOX: Brand: 3M™ DURAPORE™

## (undated) DEVICE — C-ARMOR C-ARM EQUIPMENT COVERS CLEAR STERILE UNIVERSAL FIT 12 PER CASE: Brand: C-ARMOR

## (undated) DEVICE — TRAP FLUID

## (undated) DEVICE — LAMINECTOMY: Brand: MEDLINE INDUSTRIES, INC.

## (undated) DEVICE — GARMENT,MEDLINE,DVT,INT,CALF,MED, GEN2: Brand: MEDLINE

## (undated) DEVICE — ELECTRODE PT RET AD L9FT HI MOIST COND ADH HYDRGEL CORDED

## (undated) DEVICE — COVER LT HNDL CAM BLU DISP W/ SURG CTRL

## (undated) DEVICE — APPLICATOR MEDICATED 10.5 CC SOLUTION HI LT ORNG CHLORAPREP

## (undated) DEVICE — SUTURE VICRYL + SZ 0 L18IN ABSRB UD L36MM CT-1 1/2 CIR VCP840D

## (undated) DEVICE — PAD,NON-ADHERENT,3X8,STERILE,LF,1/PK: Brand: MEDLINE

## (undated) DEVICE — COUNTER NDL 40 COUNT HLD 70 NUM FOAM BLK SGL MAG W BLDE REMV

## (undated) DEVICE — GAUZE,SPONGE,4"X4",16PLY,XRAY,STRL,LF: Brand: MEDLINE

## (undated) DEVICE — SOLUTION IV 1000ML 0.9% SOD CHL

## (undated) DEVICE — TOOL MR8-14MH30 MR8 14CM MATCH 3MM: Brand: MIDAS REX MR8

## (undated) DEVICE — SPONGE,NEURO,.75"X.75",XR,STRL,LF,10/PK: Brand: MEDLINE

## (undated) DEVICE — PIN ADLT MAYFIELD RIGID MOLD FINGER